# Patient Record
Sex: FEMALE | Race: WHITE | Employment: UNEMPLOYED | URBAN - METROPOLITAN AREA
[De-identification: names, ages, dates, MRNs, and addresses within clinical notes are randomized per-mention and may not be internally consistent; named-entity substitution may affect disease eponyms.]

---

## 2017-05-24 ENCOUNTER — TELEPHONE (OUTPATIENT)
Dept: INTERNAL MEDICINE CLINIC | Facility: CLINIC | Age: 62
End: 2017-05-24

## 2017-05-24 NOTE — TELEPHONE ENCOUNTER
Pt questioning if ok to get shingles vaccine prior to 62th Birthday?   Plans to have done at [facility] if Dr Jose Coleman agrees

## 2017-05-26 ENCOUNTER — TELEPHONE (OUTPATIENT)
Dept: INTERNAL MEDICINE CLINIC | Facility: CLINIC | Age: 62
End: 2017-05-26

## 2017-05-26 DIAGNOSIS — Z23 NEED FOR VACCINATION: Primary | ICD-10-CM

## 2017-08-08 ENCOUNTER — OFFICE VISIT (OUTPATIENT)
Dept: INTERNAL MEDICINE CLINIC | Facility: CLINIC | Age: 62
End: 2017-08-08

## 2017-08-08 VITALS
WEIGHT: 165 LBS | DIASTOLIC BLOOD PRESSURE: 79 MMHG | SYSTOLIC BLOOD PRESSURE: 129 MMHG | HEART RATE: 86 BPM | TEMPERATURE: 99 F | BODY MASS INDEX: 29.23 KG/M2 | HEIGHT: 63 IN

## 2017-08-08 DIAGNOSIS — Z78.0 POSTMENOPAUSAL: ICD-10-CM

## 2017-08-08 DIAGNOSIS — Z85.3 HISTORY OF BREAST CANCER: ICD-10-CM

## 2017-08-08 DIAGNOSIS — Z00.00 ROUTINE GENERAL MEDICAL EXAMINATION AT A HEALTH CARE FACILITY: Primary | ICD-10-CM

## 2017-08-08 DIAGNOSIS — Z90.13 STATUS POST BILATERAL MASTECTOMY: ICD-10-CM

## 2017-08-08 PROCEDURE — 99396 PREV VISIT EST AGE 40-64: CPT | Performed by: INTERNAL MEDICINE

## 2017-08-08 NOTE — PATIENT INSTRUCTIONS
Component      Latest Ref Rng & Units 1/31/2017 10/18/2016 10/1/2016   WHITE BLOOD CELL COUNT      3.8 - 10.8 Thousand/uL   4.7   RED BLOOD CELL COUNT      3.80 - 5.10 Million/uL   4.01   Hemoglobin      11.7 - 15.5 g/dL   13.2   Hematocrit      35.0 - 45. BILIRUBIN      NEGATIVE NEGATIVE NEGATIVE NEGATIVE   KETONES      NEGATIVE NEGATIVE NEGATIVE NEGATIVE   OCCULT BLOOD      NEGATIVE NEGATIVE NEGATIVE NEGATIVE   PROTEIN (URINE DIPSTICK)      NEGATIVE NEGATIVE NEGATIVE NEGATIVE   NITRITE, URINE      Lynda Mars A hip or vertebral (clinical or morphometric) fracture       T score < -2.5 at the femoral neck or spine after appropriate  evaluation to exclude secondary causes.        Low bone mass (T score between -1.0 and -2.5 at the femoral neck or     spine) and a

## 2017-08-11 LAB
ABSOLUTE BASOPHILS: 19 CELLS/UL (ref 0–200)
ABSOLUTE EOSINOPHILS: 110 CELLS/UL (ref 15–500)
ABSOLUTE LYMPHOCYTES: 1253 CELLS/UL (ref 850–3900)
ABSOLUTE MONOCYTES: 302 CELLS/UL (ref 200–950)
ABSOLUTE NEUTROPHILS: 3115 CELLS/UL (ref 1500–7800)
ALBUMIN/GLOBULIN RATIO: 1.6 (CALC) (ref 1–2.5)
ALBUMIN: 4.2 G/DL (ref 3.6–5.1)
ALKALINE PHOSPHATASE: 94 U/L (ref 33–130)
ALT: 18 U/L (ref 6–29)
APPEARANCE: CLEAR
AST: 17 U/L (ref 10–35)
BASOPHILS: 0.4 %
BILIRUBIN, TOTAL: 1 MG/DL (ref 0.2–1.2)
BILIRUBIN: NEGATIVE
BUN: 16 MG/DL (ref 7–25)
CALCIUM: 9.3 MG/DL (ref 8.6–10.4)
CARBON DIOXIDE: 26 MMOL/L (ref 20–31)
CHLORIDE: 105 MMOL/L (ref 98–110)
CHOL/HDLC RATIO: 5.4 (CALC)
CHOLESTEROL, TOTAL: 241 MG/DL (ref 125–200)
COLOR: YELLOW
CREATININE: 0.66 MG/DL (ref 0.5–0.99)
EGFR IF AFRICN AM: 111 ML/MIN/1.73M2
EGFR IF NONAFRICN AM: 96 ML/MIN/1.73M2
EOSINOPHILS: 2.3 %
GLOBULIN: 2.6 G/DL (CALC) (ref 1.9–3.7)
GLUCOSE: 92 MG/DL (ref 65–99)
GLUCOSE: NEGATIVE
HDL CHOLESTEROL: 45 MG/DL
HEMATOCRIT: 37.5 % (ref 35–45)
HEMOGLOBIN: 13.5 G/DL (ref 11.7–15.5)
KETONES: NEGATIVE
LDL-CHOLESTEROL: 158 MG/DL (CALC)
LYMPHOCYTES: 26.1 %
MCH: 33.6 PG (ref 27–33)
MCHC: 36 G/DL (ref 32–36)
MCV: 93.3 FL (ref 80–100)
MONOCYTES: 6.3 %
MPV: 11.6 FL (ref 7.5–12.5)
NEUTROPHILS: 64.9 %
NITRITE: NEGATIVE
NON-HDL CHOLESTEROL: 196 MG/DL (CALC)
OCCULT BLOOD: NEGATIVE
PH: 6.5 (ref 5–8)
PLATELET COUNT: 196 THOUSAND/UL (ref 140–400)
POTASSIUM: 4.2 MMOL/L (ref 3.5–5.3)
PROTEIN, TOTAL: 6.8 G/DL (ref 6.1–8.1)
PROTEIN: NEGATIVE
RDW: 13.3 % (ref 11–15)
RED BLOOD CELL COUNT: 4.02 MILLION/UL (ref 3.8–5.1)
SODIUM: 142 MMOL/L (ref 135–146)
SPECIFIC GRAVITY: 1.02 (ref 1–1.03)
T4, FREE: 1 NG/DL (ref 0.8–1.8)
TRIGLYCERIDES: 191 MG/DL
TSH: 3.33 MIU/L (ref 0.4–4.5)
WHITE BLOOD CELL COUNT: 4.8 THOUSAND/UL (ref 3.8–10.8)

## 2017-08-14 ENCOUNTER — HOSPITAL ENCOUNTER (OUTPATIENT)
Dept: BONE DENSITY | Facility: HOSPITAL | Age: 62
Discharge: HOME | End: 2017-08-14
Attending: INTERNAL MEDICINE
Payer: COMMERCIAL

## 2017-08-14 DIAGNOSIS — Z78.0 POSTMENOPAUSAL: ICD-10-CM

## 2017-08-14 PROCEDURE — 77080 DXA BONE DENSITY AXIAL: CPT | Performed by: INTERNAL MEDICINE

## 2017-08-15 NOTE — PROGRESS NOTES
HPI:    Patient ID: Norman Leal is a 63year old female.     HPI    Physical exam    /79 (BP Location: Right arm, Patient Position: Sitting, Cuff Size: adult)   Pulse 86   Temp 98.5 °F (36.9 °C) (Oral)   Ht 5' 3\" (1.6 m)   Wt 165 lb (74.8 kg) prophylactic 2013      Past Surgical History:  2013: MASTECTOMY LEFT  2013: MASTECTOMY RIGHT  2013: REMOVAL OF OVARY(S)   Family History   Problem Relation Age of Onset   • Breast Cancer Mother 72   • Breast Cancer Sister 50   • Diabetes Sister    • Cancer CANCELED:         ASSAY, THYROID STIM HORMONE, CANCELED: COMP         METABOLIC PANEL (14), CANCELED: CBC, PLATELET;         NO DIFFERENTIAL, CANCELED: LIPID PANEL,         CANCELED: URINE MICROSCOPIC W REFLEX CULTURE        Labs ordered  Doing well    (Z7

## 2017-10-27 ENCOUNTER — NURSE ONLY (OUTPATIENT)
Dept: INTERNAL MEDICINE CLINIC | Facility: CLINIC | Age: 62
End: 2017-10-27

## 2017-10-27 DIAGNOSIS — Z23 IMMUNIZATION DUE: Primary | ICD-10-CM

## 2017-10-27 PROCEDURE — 90715 TDAP VACCINE 7 YRS/> IM: CPT | Performed by: INTERNAL MEDICINE

## 2017-10-27 PROCEDURE — 90471 IMMUNIZATION ADMIN: CPT | Performed by: INTERNAL MEDICINE

## 2018-10-12 ENCOUNTER — OFFICE VISIT (OUTPATIENT)
Dept: INTERNAL MEDICINE CLINIC | Facility: CLINIC | Age: 63
End: 2018-10-12
Payer: COMMERCIAL

## 2018-10-12 VITALS
BODY MASS INDEX: 29.23 KG/M2 | HEIGHT: 63 IN | HEART RATE: 80 BPM | DIASTOLIC BLOOD PRESSURE: 77 MMHG | TEMPERATURE: 98 F | SYSTOLIC BLOOD PRESSURE: 116 MMHG | WEIGHT: 165 LBS

## 2018-10-12 DIAGNOSIS — Z00.00 ROUTINE GENERAL MEDICAL EXAMINATION AT A HEALTH CARE FACILITY: Primary | ICD-10-CM

## 2018-10-12 PROCEDURE — 99396 PREV VISIT EST AGE 40-64: CPT | Performed by: INTERNAL MEDICINE

## 2018-10-23 NOTE — PROGRESS NOTES
HPI:    Patient ID: Krystina Ramirez is a 66year old female.     HPI    Physical exam     Doing well in general  Hx of breast CA  Was told by Oncologist  Dr Sariah Kathleen she is vazquez  7 years post cancer treatment and she remains free ofcancer    BP 11 Allergies    HISTORY:  Past Medical History:   Diagnosis Date   • Cancer of breast, female [facility]) 2013    bilateral mastectomy   • Lipid screening 06/24/2014   • Osteoporosis screening 05/27/2014    DEXA   • Ovary removal, prophylactic 2013      Past Surgic (Z00.00) Routine general medical examination at a health care facility  (primary encounter diagnosis)  Plan: ASSAY, THYROID STIM HORMONE, CBC, PLATELET; NO         DIFFERENTIAL, COMP METABOLIC PANEL (14), FREE         T4 (FREE THYROXINE), HEMOGLOBIN A1C,

## 2018-10-24 ENCOUNTER — PATIENT MESSAGE (OUTPATIENT)
Dept: INTERNAL MEDICINE CLINIC | Facility: CLINIC | Age: 63
End: 2018-10-24

## 2018-10-25 NOTE — TELEPHONE ENCOUNTER
From: Arianna Quick  To: Tamiko Irving MD  Sent: 10/24/2018 4:01 PM CDT  Subject: Other    Could you have someone update your record and MyChart to show that I received the following Immunizations per your order at [address] [place] [address] Drive:    10/24/2018    Flu Sh

## 2018-10-26 ENCOUNTER — TELEPHONE (OUTPATIENT)
Dept: OTHER | Age: 63
End: 2018-10-26

## 2018-10-26 DIAGNOSIS — R82.90 ABNORMAL URINALYSIS: Primary | ICD-10-CM

## 2018-10-26 NOTE — TELEPHONE ENCOUNTER
Pt called back. Reviewed dr's results note with pt. Verbalized understanding. Lab orders generated. Pt denies any urinary symptoms. \"this has happened to me in the past, because they don't give me a wipe to clean prior to the urine testing. \"  Pt states

## 2018-10-26 NOTE — TELEPHONE ENCOUNTER
----- Message from Richelle Sandy sent at 10/26/2018 10:06 AM CDT -----      ----- Message -----  From: Niki Patterson MD  Sent: 10/26/2018   9:36 AM  To: Em Naveed Mancilla Lpn/Cma    Labs are within normal limit except high cholesterol low-cholesterol diet ad

## 2019-04-03 ENCOUNTER — OFFICE VISIT (OUTPATIENT)
Dept: INTERNAL MEDICINE CLINIC | Facility: CLINIC | Age: 64
End: 2019-04-03
Payer: COMMERCIAL

## 2019-04-03 VITALS
DIASTOLIC BLOOD PRESSURE: 87 MMHG | WEIGHT: 168 LBS | TEMPERATURE: 100 F | SYSTOLIC BLOOD PRESSURE: 145 MMHG | HEIGHT: 63 IN | BODY MASS INDEX: 29.77 KG/M2 | HEART RATE: 102 BPM

## 2019-04-03 DIAGNOSIS — J02.9 PHARYNGITIS, UNSPECIFIED ETIOLOGY: ICD-10-CM

## 2019-04-03 DIAGNOSIS — T78.3XXA ANGIOEDEMA, INITIAL ENCOUNTER: Primary | ICD-10-CM

## 2019-04-03 PROCEDURE — 99214 OFFICE O/P EST MOD 30 MIN: CPT | Performed by: INTERNAL MEDICINE

## 2019-04-03 PROCEDURE — 87880 STREP A ASSAY W/OPTIC: CPT | Performed by: INTERNAL MEDICINE

## 2019-04-03 PROCEDURE — 99212 OFFICE O/P EST SF 10 MIN: CPT | Performed by: INTERNAL MEDICINE

## 2019-04-03 RX ORDER — LEVOCETIRIZINE DIHYDROCHLORIDE 5 MG/1
5 TABLET, FILM COATED ORAL EVERY EVENING
Qty: 60 TABLET | Refills: 11 | Status: SHIPPED | OUTPATIENT
Start: 2019-04-03 | End: 2019-11-19

## 2019-04-03 RX ORDER — PREDNISONE 20 MG/1
TABLET ORAL
Qty: 20 TABLET | Refills: 0 | Status: SHIPPED | OUTPATIENT
Start: 2019-04-03 | End: 2019-11-19 | Stop reason: ALTCHOICE

## 2019-04-03 RX ORDER — RANITIDINE 300 MG/1
300 TABLET ORAL NIGHTLY
Qty: 90 TABLET | Refills: 0 | Status: SHIPPED | OUTPATIENT
Start: 2019-04-03 | End: 2019-11-19

## 2019-04-03 NOTE — PROGRESS NOTES
HPI:    Patient ID: Ronald Linda is a 66year old female.     HPI    Facial swelling work up with this the other day  Throat feels phegmy but not short of breath  Went to [place] over the weekend visited son owns a dog  Not new product does not remembe then 20 mg po bid starting tomorrow for 4 days Disp: 20 tablet Rfl: 0   Levocetirizine Dihydrochloride (XYZAL) 5 MG Oral Tab Take 1 tablet (5 mg total) by mouth every evening.  Disp: 60 tablet Rfl: 11   raNITIdine HCl 300 MG Oral Tab Take 1 tablet (300 mg t tenderness. Abdominal: Soft. Bowel sounds are normal. She exhibits no distension and no mass. There is no hepatosplenomegaly. There is no tenderness. No hernia. Musculoskeletal: She exhibits no edema or tenderness.    Lymphadenopathy:     She has no cer

## 2019-11-19 ENCOUNTER — OFFICE VISIT (OUTPATIENT)
Dept: INTERNAL MEDICINE CLINIC | Facility: CLINIC | Age: 64
End: 2019-11-19
Payer: COMMERCIAL

## 2019-11-19 VITALS
SYSTOLIC BLOOD PRESSURE: 132 MMHG | TEMPERATURE: 99 F | HEIGHT: 63 IN | BODY MASS INDEX: 29.95 KG/M2 | WEIGHT: 169 LBS | DIASTOLIC BLOOD PRESSURE: 79 MMHG | HEART RATE: 67 BPM

## 2019-11-19 DIAGNOSIS — Z00.00 ROUTINE GENERAL MEDICAL EXAMINATION AT A HEALTH CARE FACILITY: Primary | ICD-10-CM

## 2019-11-19 DIAGNOSIS — Z12.4 CERVICAL CANCER SCREENING: ICD-10-CM

## 2019-11-19 PROCEDURE — 90471 IMMUNIZATION ADMIN: CPT | Performed by: INTERNAL MEDICINE

## 2019-11-19 PROCEDURE — 90670 PCV13 VACCINE IM: CPT | Performed by: INTERNAL MEDICINE

## 2019-11-19 PROCEDURE — 99396 PREV VISIT EST AGE 40-64: CPT | Performed by: INTERNAL MEDICINE

## 2019-11-19 NOTE — PROGRESS NOTES
HPI:    Patient ID: Vickie Flores is a 60year old female.     HPI    Physical exam    Bilateral mastectomy  saphingo oophorectomy  Ongoing follow up with oncology    Due for pap smear  Denies complaint feeling well in general  /79 (BP Location: Ri Allergies    HISTORY:  Past Medical History:   Diagnosis Date   • Cancer of breast, female [facility]) 2013    bilateral mastectomy   • Lipid screening 06/24/2014   • Osteoporosis screening 05/27/2014    DEXA   • Ovary removal, prophylactic 2013      Past Surgic Musculoskeletal:         General: No tenderness or edema. Lymphadenopathy:     She has no cervical adenopathy. Neurological: She is alert. Skin: No rash noted. She is not diaphoretic. No erythema. Nursing note and vitals reviewed.            ASS

## 2019-12-30 ENCOUNTER — OFFICE VISIT (OUTPATIENT)
Dept: INTERNAL MEDICINE CLINIC | Facility: CLINIC | Age: 64
End: 2019-12-30
Payer: COMMERCIAL

## 2019-12-30 VITALS
HEIGHT: 63 IN | HEART RATE: 92 BPM | BODY MASS INDEX: 29.66 KG/M2 | WEIGHT: 167.38 LBS | SYSTOLIC BLOOD PRESSURE: 131 MMHG | DIASTOLIC BLOOD PRESSURE: 78 MMHG

## 2019-12-30 DIAGNOSIS — J30.9 ALLERGIC RHINITIS, UNSPECIFIED SEASONALITY, UNSPECIFIED TRIGGER: ICD-10-CM

## 2019-12-30 DIAGNOSIS — R05.9 COUGH: Primary | ICD-10-CM

## 2019-12-30 PROCEDURE — 99213 OFFICE O/P EST LOW 20 MIN: CPT | Performed by: PHYSICIAN ASSISTANT

## 2019-12-30 RX ORDER — ALBUTEROL SULFATE 90 UG/1
2 AEROSOL, METERED RESPIRATORY (INHALATION) EVERY 4 HOURS PRN
Qty: 1 INHALER | Refills: 0 | Status: SHIPPED | OUTPATIENT
Start: 2019-12-30 | End: 2020-12-10

## 2019-12-30 RX ORDER — BENZONATATE 100 MG/1
100 CAPSULE ORAL 3 TIMES DAILY PRN
Qty: 30 CAPSULE | Refills: 0 | Status: SHIPPED | OUTPATIENT
Start: 2019-12-30 | End: 2020-03-10

## 2019-12-30 RX ORDER — CETIRIZINE HYDROCHLORIDE 10 MG/1
10 TABLET ORAL DAILY
Qty: 30 TABLET | Refills: 1 | Status: SHIPPED | OUTPATIENT
Start: 2019-12-30 | End: 2020-03-10

## 2019-12-30 NOTE — PROGRESS NOTES
HPI:    Patient ID: Deborah Ng is a 60year old female. HPI   Patient presents complaining of a cough that started on Thursday. Notes that she has had intermittent cough, itchy eyes and some sneezing. This morning noticed that she was wheezing. 2013   \  Family History   Problem Relation Age of Onset   • Cancer Other 61        breast   • Breast Cancer Mother 72   • Breast Cancer Sister 50   • Diabetes Sister          PHYSICAL EXAM:   /78 (BP Location: Right arm, Patient Position: Sitting, Prescriptions     Signed Prescriptions Disp Refills   • cetirizine 10 MG Oral Tab 30 tablet 1     Sig: Take 1 tablet (10 mg total) by mouth daily.    • benzonatate (TESSALON PERLES) 100 MG Oral Cap 30 capsule 0     Sig: Take 1 capsule (100 mg total) by mout

## 2020-03-10 ENCOUNTER — OFFICE VISIT (OUTPATIENT)
Dept: INTERNAL MEDICINE CLINIC | Facility: CLINIC | Age: 65
End: 2020-03-10
Payer: COMMERCIAL

## 2020-03-10 VITALS
HEART RATE: 73 BPM | DIASTOLIC BLOOD PRESSURE: 65 MMHG | TEMPERATURE: 98 F | HEIGHT: 63 IN | BODY MASS INDEX: 30.59 KG/M2 | SYSTOLIC BLOOD PRESSURE: 138 MMHG | WEIGHT: 172.63 LBS

## 2020-03-10 DIAGNOSIS — R05.9 COUGH: Primary | ICD-10-CM

## 2020-03-10 PROCEDURE — 99213 OFFICE O/P EST LOW 20 MIN: CPT | Performed by: PHYSICIAN ASSISTANT

## 2020-03-10 RX ORDER — CETIRIZINE HYDROCHLORIDE 10 MG/1
10 TABLET ORAL DAILY
Qty: 30 TABLET | Refills: 0 | Status: SHIPPED | OUTPATIENT
Start: 2020-03-10 | End: 2020-12-10

## 2020-03-10 RX ORDER — BENZONATATE 100 MG/1
100 CAPSULE ORAL 3 TIMES DAILY PRN
Qty: 30 CAPSULE | Refills: 0 | Status: SHIPPED | OUTPATIENT
Start: 2020-03-10 | End: 2020-12-10

## 2020-03-10 NOTE — PROGRESS NOTES
HPI:    Patient ID: Collins Aguilera is a 60year old female. HPI   Pt states coughing since friday, states got shingles shot friday also that's when coughing started, states taking OTC medication with no relief.   Denies any fevers, chills or body aches 63        breast   • Breast Cancer Mother 72   • Breast Cancer Sister 50   • Diabetes Sister          PHYSICAL EXAM:   /65 (BP Location: Right arm, Patient Position: Sitting, Cuff Size: large)   Pulse 73   Temp 98.1 °F (36.7 °C) (Oral)   Ht 5' 3\" (1 mouth daily. • benzonatate (TESSALON PERLES) 100 MG Oral Cap 30 capsule 0     Sig: Take 1 capsule (100 mg total) by mouth 3 (three) times daily as needed for cough.        Imaging & Referrals:  None         #[id number]

## 2020-11-24 RX ORDER — ROSUVASTATIN CALCIUM 10 MG/1
TABLET, COATED ORAL
Qty: 90 TABLET | Refills: 1 | Status: SHIPPED | OUTPATIENT
Start: 2020-11-24 | End: 2021-05-31

## 2020-12-10 ENCOUNTER — LAB ENCOUNTER (OUTPATIENT)
Dept: LAB | Age: 65
End: 2020-12-10
Attending: INTERNAL MEDICINE
Payer: COMMERCIAL

## 2020-12-10 ENCOUNTER — OFFICE VISIT (OUTPATIENT)
Dept: INTERNAL MEDICINE CLINIC | Facility: CLINIC | Age: 65
End: 2020-12-10
Payer: COMMERCIAL

## 2020-12-10 VITALS
TEMPERATURE: 99 F | SYSTOLIC BLOOD PRESSURE: 132 MMHG | WEIGHT: 173 LBS | HEIGHT: 63 IN | BODY MASS INDEX: 30.65 KG/M2 | HEART RATE: 71 BPM | DIASTOLIC BLOOD PRESSURE: 79 MMHG

## 2020-12-10 DIAGNOSIS — Z00.00 ROUTINE GENERAL MEDICAL EXAMINATION AT A HEALTH CARE FACILITY: ICD-10-CM

## 2020-12-10 DIAGNOSIS — Z85.3 HISTORY OF BREAST CANCER: ICD-10-CM

## 2020-12-10 DIAGNOSIS — E55.9 VITAMIN D DEFICIENCY: ICD-10-CM

## 2020-12-10 DIAGNOSIS — Z00.00 ROUTINE GENERAL MEDICAL EXAMINATION AT A HEALTH CARE FACILITY: Primary | ICD-10-CM

## 2020-12-10 PROCEDURE — 36415 COLL VENOUS BLD VENIPUNCTURE: CPT | Performed by: INTERNAL MEDICINE

## 2020-12-10 PROCEDURE — 81015 MICROSCOPIC EXAM OF URINE: CPT

## 2020-12-10 PROCEDURE — 80061 LIPID PANEL: CPT | Performed by: INTERNAL MEDICINE

## 2020-12-10 PROCEDURE — 3075F SYST BP GE 130 - 139MM HG: CPT | Performed by: INTERNAL MEDICINE

## 2020-12-10 PROCEDURE — 80053 COMPREHEN METABOLIC PANEL: CPT | Performed by: INTERNAL MEDICINE

## 2020-12-10 PROCEDURE — 85027 COMPLETE CBC AUTOMATED: CPT | Performed by: INTERNAL MEDICINE

## 2020-12-10 PROCEDURE — 99396 PREV VISIT EST AGE 40-64: CPT | Performed by: INTERNAL MEDICINE

## 2020-12-10 PROCEDURE — 82306 VITAMIN D 25 HYDROXY: CPT | Performed by: INTERNAL MEDICINE

## 2020-12-10 PROCEDURE — 83036 HEMOGLOBIN GLYCOSYLATED A1C: CPT | Performed by: INTERNAL MEDICINE

## 2020-12-10 PROCEDURE — 3008F BODY MASS INDEX DOCD: CPT | Performed by: INTERNAL MEDICINE

## 2020-12-10 PROCEDURE — 3078F DIAST BP <80 MM HG: CPT | Performed by: INTERNAL MEDICINE

## 2020-12-10 PROCEDURE — 84439 ASSAY OF FREE THYROXINE: CPT | Performed by: INTERNAL MEDICINE

## 2020-12-10 PROCEDURE — 84443 ASSAY THYROID STIM HORMONE: CPT | Performed by: INTERNAL MEDICINE

## 2020-12-18 NOTE — PROGRESS NOTES
HPI:    Patient ID: Siobhan Martinez is a 63year old female.     HPI    Physical exam  Hx of breast CA  Ongoing follow up at [facility]   No known recurrence  Doing well in general    /79 (BP Location: Right arm, Patient Position: Sitting, Cuff Size: adult by ORAL route  every day with food       Allergies:No Known Allergies    HISTORY:  Past Medical History:   Diagnosis Date   • Cancer of breast, female [facility]) 2013    bilateral mastectomy   • Lipid screening 06/24/2014   • Osteoporosis screening 05/27/2014 erythema. Nursing note and vitals reviewed.            ASSESSMENT/PLAN:   (Z00.00) Routine general medical examination at a health care facility  (primary encounter diagnosis)  Plan: ASSAY, THYROID STIM HORMONE, FREE T4 (FREE         THYROXINE), LIPID PAN

## 2021-03-16 RX ORDER — ERGOCALCIFEROL 1.25 MG/1
50000 CAPSULE ORAL
Qty: 6 CAPSULE | Refills: 1 | Status: SHIPPED | OUTPATIENT
Start: 2021-03-16 | End: 2021-06-03

## 2021-05-31 RX ORDER — ROSUVASTATIN CALCIUM 10 MG/1
TABLET, COATED ORAL
Qty: 90 TABLET | Refills: 1 | Status: SHIPPED | OUTPATIENT
Start: 2021-05-31 | End: 2021-11-22

## 2021-06-01 ENCOUNTER — TELEPHONE (OUTPATIENT)
Dept: INTERNAL MEDICINE CLINIC | Facility: CLINIC | Age: 66
End: 2021-06-01

## 2021-06-01 DIAGNOSIS — R79.89 LOW VITAMIN D LEVEL: Primary | ICD-10-CM

## 2021-06-01 NOTE — TELEPHONE ENCOUNTER
Left message to call back to relay OTC recommendations again--patient did view results letter sent by Dr. Dorothea Ambriz MA in December       Marely Stevenson MD   12/23/2020 11:54 PM CST       Send letter and copy of test result.   Low vit D     presccription s

## 2021-06-01 NOTE — TELEPHONE ENCOUNTER
Patient states she has been  taking 1 ergocalciferol 1.25 mg (33807ST) capsule every 14 days starting 3/16/21. This medication was prescribed  3/16/21. Patient has 1 refill available.      Please advise if patient should continue to take this dosage or what

## 2021-06-01 NOTE — TELEPHONE ENCOUNTER
Pt states that she is down to her last pill for Vitamin D2. Pt would like to know when she runs out of the vitamin this week does the doctor still want her to continue to take it every 2 weeks or does the doctor want her to take something over the counter.

## 2021-06-01 NOTE — TELEPHONE ENCOUNTER
Spoke with patient ( verified) and relayed Dr. Flakita Franklin message below--patient verbalizes understanding and agreement. No further questions/concerns at this time. Vitamin D level ordered--patient will go to [facility], likely tomorrow to complete.

## 2021-06-03 ENCOUNTER — TELEPHONE (OUTPATIENT)
Dept: INTERNAL MEDICINE CLINIC | Facility: CLINIC | Age: 66
End: 2021-06-03

## 2021-06-03 NOTE — TELEPHONE ENCOUNTER
Spoke with patient, (  verified ) informed of 's  instructions below  Patient verbalizes understanding and agrees.

## 2021-06-03 NOTE — TELEPHONE ENCOUNTER
Called pt regarding message below. Pt saw the normal vitamin D level result on MyChart (= 73) and is wanting to know if she is still supposed to take vitamin D 5,000 units every 14 days.  States she received a call from her pharmacy yesterday to pick it up

## 2021-08-26 ENCOUNTER — TELEPHONE (OUTPATIENT)
Dept: INTERNAL MEDICINE CLINIC | Facility: CLINIC | Age: 66
End: 2021-08-26

## 2021-08-26 NOTE — TELEPHONE ENCOUNTER
-  Please review message below. Upon review of chart, this was the last result note from Vitamin D labs. Vitamin D was 73. Please advise    6/4/2021 12:15 AM CDT Back to Top      Send letter and copy of test result.   Vit D is normal  Cont supp

## 2021-08-27 NOTE — TELEPHONE ENCOUNTER
Spoke with patient, (  verified ) informed of 's   instructions below    Advised to try to take the Vitamin D on the same day of every month for consistency    Patient verbalizes understanding and agrees.

## 2021-11-11 ENCOUNTER — TELEPHONE (OUTPATIENT)
Dept: INTERNAL MEDICINE CLINIC | Facility: CLINIC | Age: 66
End: 2021-11-11

## 2021-11-11 NOTE — TELEPHONE ENCOUNTER
Per patient she finished her Vit D last 11/08/2021 and she has an appointment for her yearly physical on 12/21/2021, patient would like to know if she needs to continue the Vit D if so she needs a refill send to her pharmacy on file.

## 2021-11-12 RX ORDER — ACETAMINOPHEN 160 MG
2000 TABLET,DISINTEGRATING ORAL DAILY
Refills: 0 | COMMUNITY
Start: 2021-11-12

## 2021-11-12 NOTE — TELEPHONE ENCOUNTER
Patient informed of provider's response/recommendations below and voiced understating and agrees with all. Med list updated with new OTC dose.

## 2021-11-22 RX ORDER — ROSUVASTATIN CALCIUM 10 MG/1
TABLET, COATED ORAL
Qty: 90 TABLET | Refills: 1 | Status: SHIPPED | OUTPATIENT
Start: 2021-11-22

## 2021-11-24 ENCOUNTER — OFFICE VISIT (OUTPATIENT)
Dept: DERMATOLOGY CLINIC | Facility: CLINIC | Age: 66
End: 2021-11-24
Payer: COMMERCIAL

## 2021-11-24 DIAGNOSIS — L57.0 AK (ACTINIC KERATOSIS): Primary | ICD-10-CM

## 2021-11-24 DIAGNOSIS — D22.9 MULTIPLE NEVI: ICD-10-CM

## 2021-11-24 DIAGNOSIS — L82.1 SEBORRHEIC KERATOSES: ICD-10-CM

## 2021-11-24 DIAGNOSIS — L91.8 ACHROCHORDON: ICD-10-CM

## 2021-11-24 DIAGNOSIS — D23.9 BENIGN NEOPLASM OF SKIN, UNSPECIFIED LOCATION: ICD-10-CM

## 2021-11-24 DIAGNOSIS — L81.4 LENTIGO: ICD-10-CM

## 2021-11-24 PROCEDURE — 99203 OFFICE O/P NEW LOW 30 MIN: CPT | Performed by: DERMATOLOGY

## 2021-12-06 NOTE — PROGRESS NOTES
Doreen Hill is a 61year old female. HPI:     CC:  Patient presents with:  Full Skin Exam: New Patient present for full body skin exam .Patient deneis any hx of sc         Allergies:  Patient has no known allergies.     HISTORY:    Past Medical Histor status: Never Smoker      Smokeless tobacco: Never Used    Substance and Sexual Activity      Alcohol use: No      Drug use: No      Sexual activity: Not on file    Other Topics      Concerns:         Service: Not Asked        Blood Transfusions: N distress. Exam total-body performed, including scalp, head, neck, face,nails, hair, external eyes, including conjunctival mucosa, eyelids, lips external ears, back, chest,/ breasts, axillae,  abdomen, arms, legs, palms.      Multiple light to medium brown, Instructions reviewed at length. Benign nevi, seborrheic  keratoses, cherry angiomas:  Reassurance regarding other benign skin lesions. Signs and symptoms of skin cancer, ABCDE's of melanoma discussed with patient.  Sunscreen use, sun protection, self exa

## 2021-12-21 ENCOUNTER — OFFICE VISIT (OUTPATIENT)
Dept: INTERNAL MEDICINE CLINIC | Facility: CLINIC | Age: 66
End: 2021-12-21
Payer: COMMERCIAL

## 2021-12-21 VITALS
BODY MASS INDEX: 30.3 KG/M2 | DIASTOLIC BLOOD PRESSURE: 76 MMHG | HEART RATE: 75 BPM | WEIGHT: 171 LBS | SYSTOLIC BLOOD PRESSURE: 138 MMHG | HEIGHT: 63 IN

## 2021-12-21 DIAGNOSIS — Z00.00 ROUTINE GENERAL MEDICAL EXAMINATION AT A HEALTH CARE FACILITY: Primary | ICD-10-CM

## 2021-12-21 DIAGNOSIS — Z90.13 STATUS POST BILATERAL MASTECTOMY: ICD-10-CM

## 2021-12-21 PROCEDURE — 99396 PREV VISIT EST AGE 40-64: CPT | Performed by: INTERNAL MEDICINE

## 2021-12-21 PROCEDURE — 3008F BODY MASS INDEX DOCD: CPT | Performed by: INTERNAL MEDICINE

## 2021-12-21 PROCEDURE — 3078F DIAST BP <80 MM HG: CPT | Performed by: INTERNAL MEDICINE

## 2021-12-21 PROCEDURE — 3075F SYST BP GE 130 - 139MM HG: CPT | Performed by: INTERNAL MEDICINE

## 2021-12-22 NOTE — PROGRESS NOTES
HPI:    Patient ID: Trenda Curling is a 61year old female.     HPI    Physical exam  Hx of bilateral mastectomy  Followed by Dinah Bobby  Recent visit with breast surgeon Appt with oncology next year  Doing well in general  No hx of pancreatic CA in f Medications   Medication Sig Dispense Refill   • ROSUVASTATIN 10 MG Oral Tab TAKE 1 TABLET BY MOUTH EVERY DAY AT NIGHT 90 tablet 1   • Vitamin D3, Cholecalciferol, 50 MCG (2000 UT) Oral Cap Take 1 capsule (2,000 Units total) by mouth daily.   0   • Multiple Breath sounds: Normal breath sounds. No wheezing or rales. Chest:      Chest wall: No tenderness. Abdominal:      General: Bowel sounds are normal. There is no distension. Palpations: Abdomen is soft. There is no mass. Tenderness:  There is no

## 2022-03-21 ENCOUNTER — NURSE ONLY (OUTPATIENT)
Dept: LAB | Age: 67
End: 2022-03-21
Attending: INTERNAL MEDICINE
Payer: COMMERCIAL

## 2022-03-21 ENCOUNTER — TELEMEDICINE (OUTPATIENT)
Dept: INTERNAL MEDICINE CLINIC | Facility: CLINIC | Age: 67
End: 2022-03-21

## 2022-03-21 DIAGNOSIS — J01.90 ACUTE NON-RECURRENT SINUSITIS, UNSPECIFIED LOCATION: ICD-10-CM

## 2022-03-21 DIAGNOSIS — J02.9 PHARYNGITIS, UNSPECIFIED ETIOLOGY: ICD-10-CM

## 2022-03-21 DIAGNOSIS — J02.9 PHARYNGITIS, UNSPECIFIED ETIOLOGY: Primary | ICD-10-CM

## 2022-03-21 PROCEDURE — 99442 PHONE E/M BY PHYS 11-20 MIN: CPT | Performed by: INTERNAL MEDICINE

## 2022-03-21 RX ORDER — AMOXICILLIN 875 MG/1
875 TABLET, COATED ORAL 2 TIMES DAILY
Qty: 20 TABLET | Refills: 0 | Status: SHIPPED | OUTPATIENT
Start: 2022-03-21 | End: 2022-03-31

## 2022-03-21 RX ORDER — PSEUDOEPHEDRINE HCL 120 MG/1
120 TABLET, FILM COATED, EXTENDED RELEASE ORAL EVERY 12 HOURS PRN
Qty: 40 TABLET | Refills: 1 | Status: SHIPPED | OUTPATIENT
Start: 2022-03-21

## 2022-03-22 LAB — SARS-COV-2 RNA RESP QL NAA+PROBE: NOT DETECTED

## 2022-05-11 ENCOUNTER — TELEPHONE (OUTPATIENT)
Dept: INTERNAL MEDICINE CLINIC | Facility: CLINIC | Age: 67
End: 2022-05-11

## 2022-05-11 RX ORDER — ROSUVASTATIN CALCIUM 10 MG/1
10 TABLET, COATED ORAL NIGHTLY
Qty: 90 TABLET | Refills: 1 | Status: SHIPPED | OUTPATIENT
Start: 2022-05-11 | End: 2022-11-04

## 2022-05-11 NOTE — TELEPHONE ENCOUNTER
Refill passed per [facility] protocol.   Requested Prescriptions   Pending Prescriptions Disp Refills    ROSUVASTATIN 10 MG Oral Tab [Pharmacy Med Name: ROSUVASTATIN CALCIUM 10 MG TAB] 90 tablet 1     Sig: TAKE 1 TABLET BY MOUTH EVERY DAY AT NIGHT        Cholesterol Medication Protocol Passed - 5/11/2022  1:50 PM        Passed - ALT in past 12 months        Passed - LDL in past 12 months        Passed - Last ALT < 80       Lab Results   Component Value Date    ALT 25 12/23/2021             Passed - Last LDL < 130     Lab Results   Component Value Date    LDL 92 12/23/2021               Passed - Appointment in past 12 or next 3 months             Recent Outpatient Visits              1 month ago Pharyngitis, unspecified etiology    [address]    Nurse Only    1 month ago Pharyngitis, unspecified etiology    [facility], Alesha Reddy MD    Telemedicine    4 months ago Routine general medical examination at a health care facility    [place] [facility] [place], [facility], [address], Peter Linn MD    Office Visit    5 months ago AK (actinic keratosis)    [address] [facility] Gerardo Nieves MD    Office Visit    1 year ago Routine general medical examination at a health care facility    Zaynab Kate MD    Office Visit

## 2022-05-11 NOTE — TELEPHONE ENCOUNTER
Patient called requesting a call back from Dr. Joseph Lagos in regards to getting the COVID booster for her and her . Patient awaiting call back.

## 2022-05-12 NOTE — TELEPHONE ENCOUNTER
Spoke with patient, (  verified ) informed of '  Message  Below    Will   Get COVID  Booster at [place]

## 2022-05-12 NOTE — TELEPHONE ENCOUNTER
Cdc guideline  Notify pt    Right now, you are eligible for a 2nd COVID-19 booster if you:  Are 48years of age or older and got your 1st booster at least 4 months ago

## 2022-10-15 ENCOUNTER — OFFICE VISIT (OUTPATIENT)
Dept: INTERNAL MEDICINE CLINIC | Facility: CLINIC | Age: 67
End: 2022-10-15
Payer: MEDICARE

## 2022-10-15 VITALS
HEART RATE: 101 BPM | SYSTOLIC BLOOD PRESSURE: 147 MMHG | DIASTOLIC BLOOD PRESSURE: 84 MMHG | HEIGHT: 63 IN | WEIGHT: 171 LBS | OXYGEN SATURATION: 98 % | BODY MASS INDEX: 30.3 KG/M2

## 2022-10-15 DIAGNOSIS — Z20.822 SUSPECTED COVID-19 VIRUS INFECTION: Primary | ICD-10-CM

## 2022-10-15 PROCEDURE — 99213 OFFICE O/P EST LOW 20 MIN: CPT | Performed by: NURSE PRACTITIONER

## 2022-10-17 ENCOUNTER — TELEPHONE (OUTPATIENT)
Dept: INTERNAL MEDICINE CLINIC | Facility: CLINIC | Age: 67
End: 2022-10-17

## 2022-10-17 DIAGNOSIS — Z20.822 SUSPECTED COVID-19 VIRUS INFECTION: Primary | ICD-10-CM

## 2022-10-17 RX ORDER — BENZONATATE 200 MG/1
200 CAPSULE ORAL 3 TIMES DAILY PRN
Qty: 30 CAPSULE | Refills: 0 | Status: SHIPPED | OUTPATIENT
Start: 2022-10-17

## 2022-10-17 NOTE — TELEPHONE ENCOUNTER
Joi Corcoran called from Lab x [phone]. She sees a Covid test on the packing slip but they did not receive a test.     She requested a call back from the office. EDSON WHITAKER, please call Lab.

## 2022-10-17 NOTE — TELEPHONE ENCOUNTER
Advised patient of CHELSIE Larkin's  note. Patient verbalized understanding and had no further questions.

## 2022-10-17 NOTE — TELEPHONE ENCOUNTER
I asked Patrick jane at the [address] [place] [address] location to see if the specimen is still in the  box upstairs in . She informed me that the specimen is still there and no one pick it up. I called lab and informed them that specimen was never picked up and is still in at [facility] location. He verbalized understanding and will document that. I inquired if they'll  sample today and he informed me that there is a scheduled  there for tonight. I verbalized understanding.

## 2022-10-17 NOTE — TELEPHONE ENCOUNTER
Patient saw Adwoa Saavedra on 10/15 and should have had prescription sent, but no prescription was sent. Could not verify medication name, but stated it was supposed to be capsules.

## 2022-10-17 NOTE — TELEPHONE ENCOUNTER
Please see note below and advise. Patient had office visit on 10/15/2022 with Micah Guerrero. COVID results still in process.

## 2022-10-18 LAB — SARS-COV-2 RNA RESP QL NAA+PROBE: NOT DETECTED

## 2022-10-26 ENCOUNTER — TELEPHONE (OUTPATIENT)
Dept: INTERNAL MEDICINE CLINIC | Facility: CLINIC | Age: 67
End: 2022-10-26

## 2022-10-26 NOTE — TELEPHONE ENCOUNTER
Patient called (identified name and ),   Saw Yesica BERTRAND on 10/15 for cold symptoms/suspected Covid, but negative PCR test.  Has been using Delsym and benzonatate, using saline, vaporizer. Still has cough, spits up a little yellow but not sure if sputum or post nasal drip. Still feels \"plugged up\" but reports able to breath through nose. Feels some clicking in ears. Tried Claritin D before office visit with NP. Has 3 more benzonatates left. Denies fever, wheezing, chest pain. No audible wheeze noted during phone call. Patient asking what else to do about cough and congestion? Wanted Dr Marleen Veras opinion. Yesica Munson NP is not in the office today. Please advise? Prescription cough med? Mucinex D?

## 2022-10-27 NOTE — TELEPHONE ENCOUNTER
Spoke with pt,  verified, pt is returning our call from yesterday. Pt was informed of MD recommendation. Pt stated she is taking Claritin D with minimal relief and this morning when she blow her nose, she had yellow mucous. Pt was advised to continue present management and make f/u appt if needed. Pt stated understanding.

## 2023-01-18 ENCOUNTER — OFFICE VISIT (OUTPATIENT)
Dept: INTERNAL MEDICINE CLINIC | Facility: CLINIC | Age: 68
End: 2023-01-18

## 2023-01-18 ENCOUNTER — LAB ENCOUNTER (OUTPATIENT)
Dept: LAB | Age: 68
End: 2023-01-18
Attending: INTERNAL MEDICINE
Payer: MEDICARE

## 2023-01-18 VITALS
HEART RATE: 83 BPM | WEIGHT: 174 LBS | SYSTOLIC BLOOD PRESSURE: 158 MMHG | DIASTOLIC BLOOD PRESSURE: 82 MMHG | BODY MASS INDEX: 30.83 KG/M2 | HEIGHT: 63 IN

## 2023-01-18 DIAGNOSIS — Z85.3 HISTORY OF BREAST CANCER: ICD-10-CM

## 2023-01-18 DIAGNOSIS — Z12.4 SCREENING FOR CERVICAL CANCER: ICD-10-CM

## 2023-01-18 DIAGNOSIS — E78.5 HYPERLIPIDEMIA, UNSPECIFIED HYPERLIPIDEMIA TYPE: ICD-10-CM

## 2023-01-18 DIAGNOSIS — E55.9 VITAMIN D DEFICIENCY: ICD-10-CM

## 2023-01-18 DIAGNOSIS — Z90.13 STATUS POST BILATERAL MASTECTOMY: ICD-10-CM

## 2023-01-18 DIAGNOSIS — Z00.00 MEDICARE ANNUAL WELLNESS VISIT, SUBSEQUENT: Primary | ICD-10-CM

## 2023-01-18 DIAGNOSIS — Z00.00 ENCOUNTER FOR ANNUAL HEALTH EXAMINATION: ICD-10-CM

## 2023-01-18 DIAGNOSIS — R73.03 PREDIABETES: ICD-10-CM

## 2023-01-18 DIAGNOSIS — Z78.0 POSTMENOPAUSAL: ICD-10-CM

## 2023-01-18 LAB
ALBUMIN SERPL-MCNC: 3.7 G/DL (ref 3.4–5)
ALBUMIN/GLOB SERPL: 0.9 {RATIO} (ref 1–2)
ALP LIVER SERPL-CCNC: 82 U/L
ALT SERPL-CCNC: 30 U/L
ANION GAP SERPL CALC-SCNC: 4 MMOL/L (ref 0–18)
AST SERPL-CCNC: 17 U/L (ref 15–37)
BASOPHILS # BLD AUTO: 0.02 X10(3) UL (ref 0–0.2)
BASOPHILS NFR BLD AUTO: 0.4 %
BILIRUB SERPL-MCNC: 1 MG/DL (ref 0.1–2)
BILIRUB UR QL: NEGATIVE
BUN BLD-MCNC: 18 MG/DL (ref 7–18)
BUN/CREAT SERPL: 22.8 (ref 10–20)
CALCIUM BLD-MCNC: 9.5 MG/DL (ref 8.5–10.1)
CHLORIDE SERPL-SCNC: 108 MMOL/L (ref 98–112)
CHOLEST SERPL-MCNC: 172 MG/DL (ref ?–200)
CLARITY UR: CLEAR
CO2 SERPL-SCNC: 28 MMOL/L (ref 21–32)
COLOR UR: YELLOW
CREAT BLD-MCNC: 0.79 MG/DL
DEPRECATED RDW RBC AUTO: 46.6 FL (ref 35.1–46.3)
EOSINOPHIL # BLD AUTO: 0.11 X10(3) UL (ref 0–0.7)
EOSINOPHIL NFR BLD AUTO: 2 %
ERYTHROCYTE [DISTWIDTH] IN BLOOD BY AUTOMATED COUNT: 13.2 % (ref 11–15)
EST. AVERAGE GLUCOSE BLD GHB EST-MCNC: 134 MG/DL (ref 68–126)
FASTING PATIENT LIPID ANSWER: YES
FASTING STATUS PATIENT QL REPORTED: YES
GFR SERPLBLD BASED ON 1.73 SQ M-ARVRAT: 83 ML/MIN/1.73M2 (ref 60–?)
GLOBULIN PLAS-MCNC: 4 G/DL (ref 2.8–4.4)
GLUCOSE BLD-MCNC: 111 MG/DL (ref 70–99)
GLUCOSE UR-MCNC: NEGATIVE MG/DL
HBA1C MFR BLD: 6.3 % (ref ?–5.7)
HCT VFR BLD AUTO: 41 %
HDLC SERPL-MCNC: 54 MG/DL (ref 40–59)
HGB BLD-MCNC: 13.8 G/DL
HGB UR QL STRIP.AUTO: NEGATIVE
IMM GRANULOCYTES # BLD AUTO: 0.02 X10(3) UL (ref 0–1)
IMM GRANULOCYTES NFR BLD: 0.4 %
KETONES UR-MCNC: NEGATIVE MG/DL
LDLC SERPL CALC-MCNC: 91 MG/DL (ref ?–100)
LEUKOCYTE ESTERASE UR QL STRIP.AUTO: NEGATIVE
LYMPHOCYTES # BLD AUTO: 1.18 X10(3) UL (ref 1–4)
LYMPHOCYTES NFR BLD AUTO: 21.3 %
MCH RBC QN AUTO: 32 PG (ref 26–34)
MCHC RBC AUTO-ENTMCNC: 33.7 G/DL (ref 31–37)
MCV RBC AUTO: 95.1 FL
MONOCYTES # BLD AUTO: 0.27 X10(3) UL (ref 0.1–1)
MONOCYTES NFR BLD AUTO: 4.9 %
NEUTROPHILS # BLD AUTO: 3.95 X10 (3) UL (ref 1.5–7.7)
NEUTROPHILS # BLD AUTO: 3.95 X10(3) UL (ref 1.5–7.7)
NEUTROPHILS NFR BLD AUTO: 71 %
NITRITE UR QL STRIP.AUTO: NEGATIVE
NONHDLC SERPL-MCNC: 118 MG/DL (ref ?–130)
OSMOLALITY SERPL CALC.SUM OF ELEC: 293 MOSM/KG (ref 275–295)
PH UR: 6 [PH] (ref 5–8)
PLATELET # BLD AUTO: 209 10(3)UL (ref 150–450)
POTASSIUM SERPL-SCNC: 4.1 MMOL/L (ref 3.5–5.1)
PROT SERPL-MCNC: 7.7 G/DL (ref 6.4–8.2)
PROT UR-MCNC: NEGATIVE MG/DL
RBC # BLD AUTO: 4.31 X10(6)UL
SODIUM SERPL-SCNC: 140 MMOL/L (ref 136–145)
SP GR UR STRIP: 1.02 (ref 1–1.03)
T4 FREE SERPL-MCNC: 0.8 NG/DL (ref 0.8–1.7)
TRIGL SERPL-MCNC: 156 MG/DL (ref 30–149)
TSI SER-ACNC: 2.09 MIU/ML (ref 0.36–3.74)
UROBILINOGEN UR STRIP-ACNC: 0.2
VIT D+METAB SERPL-MCNC: 55.8 NG/ML (ref 30–100)
VLDLC SERPL CALC-MCNC: 25 MG/DL (ref 0–30)
WBC # BLD AUTO: 5.6 X10(3) UL (ref 4–11)

## 2023-01-18 PROCEDURE — 36415 COLL VENOUS BLD VENIPUNCTURE: CPT | Performed by: INTERNAL MEDICINE

## 2023-01-18 PROCEDURE — 82306 VITAMIN D 25 HYDROXY: CPT

## 2023-01-18 PROCEDURE — 85025 COMPLETE CBC W/AUTO DIFF WBC: CPT | Performed by: INTERNAL MEDICINE

## 2023-01-18 PROCEDURE — 90677 PCV20 VACCINE IM: CPT | Performed by: INTERNAL MEDICINE

## 2023-01-18 PROCEDURE — G0439 PPPS, SUBSEQ VISIT: HCPCS | Performed by: INTERNAL MEDICINE

## 2023-01-18 PROCEDURE — 81003 URINALYSIS AUTO W/O SCOPE: CPT | Performed by: INTERNAL MEDICINE

## 2023-01-18 PROCEDURE — 84439 ASSAY OF FREE THYROXINE: CPT | Performed by: INTERNAL MEDICINE

## 2023-01-18 PROCEDURE — 84443 ASSAY THYROID STIM HORMONE: CPT | Performed by: INTERNAL MEDICINE

## 2023-01-18 PROCEDURE — 83036 HEMOGLOBIN GLYCOSYLATED A1C: CPT | Performed by: INTERNAL MEDICINE

## 2023-01-18 PROCEDURE — 80061 LIPID PANEL: CPT | Performed by: INTERNAL MEDICINE

## 2023-01-18 PROCEDURE — 80053 COMPREHEN METABOLIC PANEL: CPT | Performed by: INTERNAL MEDICINE

## 2023-01-18 PROCEDURE — G0009 ADMIN PNEUMOCOCCAL VACCINE: HCPCS | Performed by: INTERNAL MEDICINE

## 2023-01-19 DIAGNOSIS — R73.03 PREDIABETES: Primary | ICD-10-CM

## 2023-01-19 RX ORDER — METFORMIN HYDROCHLORIDE 500 MG/1
500 TABLET, EXTENDED RELEASE ORAL DAILY
Qty: 90 TABLET | Refills: 0 | Status: SHIPPED | OUTPATIENT
Start: 2023-01-19

## 2023-02-01 ENCOUNTER — HOSPITAL ENCOUNTER (OUTPATIENT)
Dept: BONE DENSITY | Facility: HOSPITAL | Age: 68
Discharge: HOME | End: 2023-02-01
Attending: INTERNAL MEDICINE
Payer: MEDICARE

## 2023-02-01 DIAGNOSIS — Z78.0 POSTMENOPAUSAL: ICD-10-CM

## 2023-02-01 PROCEDURE — 77080 DXA BONE DENSITY AXIAL: CPT | Performed by: INTERNAL MEDICINE

## 2023-03-11 ENCOUNTER — MOBILE ENCOUNTER (OUTPATIENT)
Dept: INTERNAL MEDICINE CLINIC | Facility: CLINIC | Age: 68
End: 2023-03-11

## 2023-03-11 ENCOUNTER — NURSE TRIAGE (OUTPATIENT)
Dept: INTERNAL MEDICINE CLINIC | Facility: CLINIC | Age: 68
End: 2023-03-11

## 2023-03-11 NOTE — TELEPHONE ENCOUNTER
Spoke to Dr. Juli Beal via secure chat. Paxlovid sent to [facility]. Patient is stop rosuvastatin due to a drug interaction with rosuvastatin and Paxlovid. Patient is to continue to hold rosuvastatin a total of  three days after finishing Paxlovid. Paxlovid is to be given within five days from the start of symptoms. Spoke to patient and advised her of instructions above and patient verbalized understanding.

## 2023-04-17 DIAGNOSIS — R73.03 PREDIABETES: ICD-10-CM

## 2023-04-17 RX ORDER — METFORMIN HYDROCHLORIDE 500 MG/1
500 TABLET, EXTENDED RELEASE ORAL DAILY
Qty: 90 TABLET | Refills: 0 | Status: SHIPPED | OUTPATIENT
Start: 2023-04-17

## 2023-04-17 NOTE — TELEPHONE ENCOUNTER
Please review; protocol failed. Requested Prescriptions   Pending Prescriptions Disp Refills    METFORMIN  MG Oral Tablet 24 Hr [Pharmacy Med Name: METFORMIN HCL  MG TABLET] 90 tablet 0     Sig: Take 1 tablet (500 mg total) by mouth daily.        There is no refill protocol information for this order          Future Appointments         Provider Department Appt Notes    In 4 days William Corley MD [address], [facility] f/u poss DM  \"policy informed\"            Recent Outpatient Visits              2 months ago [facility] annual wellness visit, subsequent    Juan Ansari MD    Office Visit    6 months ago Suspected COVID-19 virus infection    [facility], [address], [place], Anival Bush., APRN    Office Visit    1 year ago Pharyngitis, unspecified etiology    [address], [place], [address] [facility]    Nurse Only    1 year ago Pharyngitis, unspecified etiology    María Linn MD    Telemedicine    1 year ago Routine general medical examination at a health care facility    Earlene Mullins MD    Office Visit

## 2023-04-21 ENCOUNTER — OFFICE VISIT (OUTPATIENT)
Dept: INTERNAL MEDICINE CLINIC | Facility: CLINIC | Age: 68
End: 2023-04-21

## 2023-04-21 VITALS
HEIGHT: 63 IN | DIASTOLIC BLOOD PRESSURE: 82 MMHG | SYSTOLIC BLOOD PRESSURE: 126 MMHG | RESPIRATION RATE: 16 BRPM | WEIGHT: 166 LBS | BODY MASS INDEX: 29.41 KG/M2 | HEART RATE: 84 BPM

## 2023-04-21 DIAGNOSIS — E78.5 HYPERLIPIDEMIA, UNSPECIFIED HYPERLIPIDEMIA TYPE: ICD-10-CM

## 2023-04-21 DIAGNOSIS — E55.9 VITAMIN D DEFICIENCY: ICD-10-CM

## 2023-04-21 DIAGNOSIS — R73.03 PREDIABETES: Primary | ICD-10-CM

## 2023-04-21 PROCEDURE — 99214 OFFICE O/P EST MOD 30 MIN: CPT | Performed by: INTERNAL MEDICINE

## 2023-05-03 RX ORDER — ROSUVASTATIN CALCIUM 10 MG/1
10 TABLET, COATED ORAL NIGHTLY
Qty: 90 TABLET | Refills: 3 | Status: SHIPPED | OUTPATIENT
Start: 2023-05-03

## 2023-05-03 NOTE — TELEPHONE ENCOUNTER
Refill passed per [place] protocol.     .  Requested Prescriptions   Pending Prescriptions Disp Refills    ROSUVASTATIN 10 MG Oral Tab [Pharmacy Med Name: ROSUVASTATIN CALCIUM 10 MG TAB] 90 tablet 1     Sig: TAKE 1 TABLET BY MOUTH EVERY DAY AT NIGHT       Cholesterol Medication Protocol Passed - 5/3/2023 12:06 AM        Passed - ALT in past 12 months        Passed - LDL in past 12 months        Passed - Last ALT < 80     Lab Results   Component Value Date    ALT 30 01/18/2023             Passed - Last LDL < 130     Lab Results   Component Value Date    LDL 91 01/18/2023               Passed - In person appointment or virtual visit in the past 12 mos or appointment in next 3 mos     Recent Outpatient Visits              1 week ago Prediabetes    Nicolas Marley MD    Office Visit    3 months ago Mirna Mello annual wellness visit, subsequent    Randa Ortiz MD    Office Visit    6 months ago Suspected COVID-19 virus infection    [facility], [address], [place], Cipriano Flatness., APRN    Office Visit    1 year ago Pharyngitis, unspecified etiology    [address], [place], [address] [facility]    Nurse Only    1 year ago Pharyngitis, unspecified etiology    [facility], Tomas Dra[place], [address] Visits              1 week ago Prediabetes    Nicolas Marley MD    Office Visit    3 months ago Mirna Mello annual wellness visit, subsequent    Randa Ortiz MD    Office Visit    6 months ago Suspected COVID-19 virus infection    [address], [address], [place], Cipriano Flatness., APRN    Office Visit    1 year ago Pharyngitis, unspecified etiology    [address] [place], [address] [facility]    Nurse Only    1 year ago Pharyngitis, unspecified etiology    [address] [facility], Hussein Gomez MD    Telemedicine

## 2023-05-13 ENCOUNTER — APPOINTMENT (OUTPATIENT)
Dept: GENERAL RADIOLOGY | Age: 68
End: 2023-05-13
Attending: NURSE PRACTITIONER
Payer: MEDICARE

## 2023-05-13 ENCOUNTER — NURSE TRIAGE (OUTPATIENT)
Dept: INTERNAL MEDICINE CLINIC | Facility: CLINIC | Age: 68
End: 2023-05-13

## 2023-05-13 ENCOUNTER — HOSPITAL ENCOUNTER (OUTPATIENT)
Age: 68
Discharge: HOME | End: 2023-05-13
Payer: MEDICARE

## 2023-05-13 VITALS
WEIGHT: 166 LBS | TEMPERATURE: 98 F | DIASTOLIC BLOOD PRESSURE: 87 MMHG | BODY MASS INDEX: 29.41 KG/M2 | HEART RATE: 93 BPM | RESPIRATION RATE: 20 BRPM | SYSTOLIC BLOOD PRESSURE: 147 MMHG | OXYGEN SATURATION: 97 % | HEIGHT: 63 IN

## 2023-05-13 DIAGNOSIS — J06.9 UPPER RESPIRATORY VIRUS: Primary | ICD-10-CM

## 2023-05-13 LAB — SARS-COV-2 RNA RESP QL NAA+PROBE: NOT DETECTED

## 2023-05-13 PROCEDURE — 99204 OFFICE O/P NEW MOD 45 MIN: CPT

## 2023-05-13 PROCEDURE — 71046 X-RAY EXAM CHEST 2 VIEWS: CPT | Performed by: NURSE PRACTITIONER

## 2023-05-13 PROCEDURE — 99214 OFFICE O/P EST MOD 30 MIN: CPT

## 2023-05-13 RX ORDER — ALBUTEROL SULFATE 90 UG/1
2 AEROSOL, METERED RESPIRATORY (INHALATION) EVERY 4 HOURS PRN
Qty: 1 EACH | Refills: 0 | Status: SHIPPED | OUTPATIENT
Start: 2023-05-13 | End: 2023-06-12

## 2023-05-13 RX ORDER — FLUTICASONE PROPIONATE 50 MCG
2 SPRAY, SUSPENSION (ML) NASAL DAILY
Qty: 16 G | Refills: 0 | Status: SHIPPED | OUTPATIENT
Start: 2023-05-13 | End: 2023-06-12

## 2023-05-13 RX ORDER — BENZONATATE 100 MG/1
200 CAPSULE ORAL 3 TIMES DAILY PRN
Qty: 30 CAPSULE | Refills: 0 | Status: SHIPPED | OUTPATIENT
Start: 2023-05-13 | End: 2023-06-12

## 2023-05-13 NOTE — ED INITIAL ASSESSMENT (HPI)
Pt presents to the IC with c/o 2 weeks of nasal congestion, coupled with a cough that started 2-3 days ago when laying flat. Pt thinks she hears a wheeze when laying flat.

## 2023-05-13 NOTE — DISCHARGE INSTRUCTIONS
Push fluids. Rest.  Tylenol as needed for pain or fever. Take the medications as prescribed. Follow-up with your doctor. Return for any concerns.

## 2023-07-12 DIAGNOSIS — R73.03 PREDIABETES: ICD-10-CM

## 2023-07-12 NOTE — TELEPHONE ENCOUNTER
Patient is requesting a call from Dr Natalie Conley nurse. Patient is wondering if Dr Hakan Lopez still wants her to remain on the Metformin.      If she does want patient to keep taking medicine, then patient will need a refill, currently has 5 tabs left

## 2023-07-13 RX ORDER — METFORMIN HYDROCHLORIDE 500 MG/1
500 TABLET, EXTENDED RELEASE ORAL DAILY
Qty: 90 TABLET | Refills: 3 | Status: SHIPPED | OUTPATIENT
Start: 2023-07-13

## 2023-07-13 NOTE — TELEPHONE ENCOUNTER
Patient calling, confirmed name and . She has 5 days left of metformin therefore she is calling to confirm that the script has been sent to Dr. Araceli Aguirre. She also states that Dr. Araceli Aguirre advised her to get her labs drawn in October and to schedule her annual Medicare physical for January. Dr. Araceli Aguirre, she would like a call back only if you have different advice.

## 2023-07-13 NOTE — TELEPHONE ENCOUNTER
Refill passed per [place] [facility] protocol. Requested Prescriptions   Pending Prescriptions Disp Refills    metFORMIN  MG Oral Tablet 24 Hr 90 tablet 3     Sig: Take 1 tablet (500 mg total) by mouth daily.        Diabetes Medication Protocol Passed - 7/13/2023  9:45 AM        Passed - Last A1C < 7.5 and within past 6 months     Lab Results   Component Value Date    A1C 6.3 (H) 01/18/2023             Passed - In person appointment or virtual visit in the past 6 mos or appointment in next 3 mos     Recent Outpatient Visits              2 months ago Prediabetes    Alka Caballero MD    Office Visit    [facility]    [address] [facility], Heidi Mcallister MD    Office Visit    9 months ago Suspected COVID-19 virus infection    [facility], [address], [place], APRN    Office Visit    1 year ago Pharyngitis, unspecified etiology    [address], [address] [facility]    Nurse Only    1 year ago Pharyngitis, unspecified etiology    [address] [facility], rTistin Lozoya MD    Telemedicine                      Passed - EGFRCR or GFRNAA > 50     GFR Evaluation  EGFRCR: 83 , resulted on 1/18/2023          Passed - GFR in the past 12 months             Recent Outpatient Visits              2 months ago Prediabetes    Alka Caballero MD    Office Visit    5 months ago Mirna Mello annual wellness visit, subsequent    Hugo Albert MD    Office Visit    9 months ago Suspected COVID-19 virus infection    [facility], [address], [place], APRN    Office Visit    1 year ago Pharyngitis, unspecified etiology    [place], [facility], [place], [address] [facility]    Nurse Only    1 year ago Pharyngitis, unspecified etiology    [address] [facility], Charles Urbina MD    Telemedicine

## 2023-10-17 ENCOUNTER — OFFICE VISIT (OUTPATIENT)
Dept: OBGYN CLINIC | Facility: CLINIC | Age: 68
End: 2023-10-17
Payer: MEDICARE

## 2023-10-17 VITALS
SYSTOLIC BLOOD PRESSURE: 136 MMHG | HEART RATE: 76 BPM | BODY MASS INDEX: 30 KG/M2 | DIASTOLIC BLOOD PRESSURE: 83 MMHG | WEIGHT: 169 LBS

## 2023-10-17 DIAGNOSIS — Z01.419 ENCOUNTER FOR WELL WOMAN EXAM WITH ROUTINE GYNECOLOGICAL EXAM: Primary | ICD-10-CM

## 2023-10-17 PROCEDURE — G0101 CA SCREEN;PELVIC/BREAST EXAM: HCPCS | Performed by: OBSTETRICS & GYNECOLOGY

## 2023-10-17 NOTE — PROGRESS NOTES
Kaylan Ingram is a 79year old female  No LMP recorded. (Menstrual status: Menopause). Patient presents with:  Gyn Exam: New pt, annual  .  She has no complaints. Denies postmenopausal bleeding, urinary or sexual issues.       OBSTETRICS HISTORY:     OB History    Para Term  AB Living   2 2 2     2   SAB IAB Ectopic Multiple Live Births           2      # Outcome Date GA Lbr Tarik/2nd Weight Sex Delivery Anes PTL Lv   2 Term 5    M NORMAL SPONT   JUAN LUIS   1 Term 18    F NORMAL SPONT   JUAN LUIS       GYNE HISTORY:     Periods none due to menopause        Latest Ref Rng & Units 2019    10:06 AM   RECENT PAP RESULTS   Thinprep Pap Negative for intraepithelial lesion or malignancy Negative for intraepithelial lesion or malignancy    HPV Negative Negative          MEDICAL HISTORY:     Past Medical History:   Diagnosis Date    BRCA gene positive     [facility] 2013    bilateral masectomy / chemo / XRT     Past Surgical History:   Procedure Laterality Date    MASTECTOMY LEFT      MASTECTOMY RIGHT      REMOVAL OF OVARY(S) Bilateral     prophylatic     OB History     T2    L2    SAB0  IAB0  Ectopic0  Multiple0  Live Births2      SOCIAL HISTORY:     Social History    Socioeconomic History      Marital status:       Spouse name: Not on file      Number of children: Not on file      Years of education: Not on file      Highest education level: Not on file    Occupational History      Not on file    Tobacco Use      Smoking status: Never      Smokeless tobacco: Never    Vaping Use      Vaping Use: Never used    Substance and Sexual Activity      Alcohol use: No      Drug use: No      Sexual activity: Not Currently    Other Topics      Concerns:         Service: Not Asked        Blood Transfusions: Not Asked        Caffeine Concern: No        Occupational Exposure: Not Asked        Hobby Hazards: Not Asked        Sleep Concern: Not Asked Stress Concern: Not Asked        Weight Concern: Not Asked        Special Diet: Not Asked        Back Care: Not Asked        Exercise: Not Asked        Bike Helmet: Not Asked        Seat Belt: Not Asked        Self-Exams: Not Asked        Grew up on a farm: Not Asked        History of tanning: Not Asked        Outdoor occupation: Not Asked        Breast feeding: Not Asked        Reaction to local anesthetic: Not Asked    Social History Narrative      Not on file    Social Determinants of Health  Financial Resource Strain: Not on file  Food Insecurity: Not on file  Transportation Needs: Not on file  Physical Activity: Not on file  Stress: Not on file  Social Connections: Not on file  Housing Stability: Not on file    FAMILY HISTORY:     Family History   Problem Relation Age of Onset    Breast Cancer Mother 72    Breast Cancer Sister 50    Diabetes Sister     Breast Cancer Maternal Aunt 61       MEDICATIONS:       Current Outpatient Medications:     metFORMIN  MG Oral Tablet 24 Hr, Take 1 tablet (500 mg total) by mouth daily. , Disp: 90 tablet, Rfl: 3    rosuvastatin 10 MG Oral Tab, Take 1 tablet (10 mg total) by mouth nightly., Disp: 90 tablet, Rfl: 3    Vitamin D3, Cholecalciferol, 50 MCG (2000 UT) Oral Cap, Take 1 capsule (2,000 Units total) by mouth daily. , Disp: , Rfl: 0    Multiple Vitamin Oral Tab, Take  by mouth.  take 1 tablet by ORAL route  every day with food, Disp: , Rfl:     ALLERGIES:     No Known Allergies      REVIEW OF SYSTEMS:     Constitutional:    denies fever / chills  Eyes:     denies blurred or double vision  Cardiovascular:  denies chest pain or palpitations  Respiratory:    denies shortness of breath  Gastrointestinal:  denies severe abdominal pain, frequent diarrhea or constipation, nausea / vomiting  Genitourinary:    denies dysuria, bothersome incontinence  Skin/Breast:   denies any breast pain, lumps, or discharge  Neurological:    denies frequent severe headaches  Psychiatric: denies depression or anxiety, thoughts of harming self or others  Heme/Lymph:    denies easy bruising or bleeding    PHYSICAL EXAM:   Blood pressure 136/83, pulse 76, weight 169 lb (76.7 kg). Constitutional: well developed, well nourished  Head/Face: normocephalic  Neck/Thyroid: thyroid symmetric, no thyromegaly, no nodules, no adenopathy  Lymphatic:no abnormal supraclavicular or axillary adenopathy is noted  Breast: Bilateral masectomy scars  Respiratory:  nonlabored breathing  Cardiovascular: regular rate and rhythm  Abdomen:  soft, nontender, nondistended, no masses  Skin/Hair: no unusual rashes or bruises  Extremities: no edema, no cyanosis  Psychiatric:  Oriented to time, place, person and situation. Appropriate mood and affect    Pelvic Exam:  External Genitalia: normal appearance, hair distribution, and no lesions  Urethral Meatus:  normal in size, location, without lesions and prolapse  Bladder:  No fullness, masses or tenderness  Vagina:  Normal appearance without lesions, no abnormal discharge  Cervix:  Normal without tenderness on motion  Uterus: normal in size, contour, position, mobility, without tenderness  Adnexa: normal without masses or tenderness  Perineum: normal  Anus: no hemorroids    ASSESSMENT & PLAN:     Mag Westbrook was seen today for gyn exam.    Diagnoses and all orders for this visit:    Encounter for well woman exam with routine gynecological exam  -     ThinPrep PAP Smear  -     Hpv Dna  High Risk , Thin Prep Collect          SUMMARY:    Pap: Next cotest today -- if neg / neg, no more paps per ASCCP guidelines. Mammogram: No -- s/p bilateral masectomy    BCM: Postmenopausal    STD screening: declines    Colon cancer screening: UTD    Misc: Calcium needs reviewed (1500 mg diet + supplement). Weight bearing exercise encouraged.  Call if any VB (if perimenopausal, reviewed abn VB patterns)    HM updated    Depression screen:   Depression Screening (PHQ-2/PHQ-9): Over the LAST 2 WEEKS Little interest or pleasure in doing things (over the last two weeks)?: Not at all    Feeling down, depressed, or hopeless (over the last two weeks)?: Not at all    PHQ-2 SCORE: 0          FOLLOW-UP     Return in about 1 year (around 10/17/2024) for annual gyne exam.    Note to patient and family:  The [id number] makes medical notes available to patients in the interest of transparency. However, please be advised that this is a medical document. It is intended as a peer to peer communication. It is written in medical language and may contain abbreviations or verbiage that are technical and unfamiliar. It may appear blunt or direct. Medical documents are intended to carry relevant information, facts as evident, and the clinical opinion of the practitioner.

## 2023-10-18 LAB — HPV I/H RISK 1 DNA SPEC QL NAA+PROBE: NEGATIVE

## 2023-11-01 ENCOUNTER — OFFICE VISIT (OUTPATIENT)
Dept: DERMATOLOGY CLINIC | Facility: CLINIC | Age: 68
End: 2023-11-01
Payer: MEDICARE

## 2023-11-01 DIAGNOSIS — D22.9 MULTIPLE NEVI: ICD-10-CM

## 2023-11-01 DIAGNOSIS — D23.9 BENIGN NEOPLASM OF SKIN, UNSPECIFIED LOCATION: ICD-10-CM

## 2023-11-01 DIAGNOSIS — L57.0 AK (ACTINIC KERATOSIS): Primary | ICD-10-CM

## 2023-11-01 DIAGNOSIS — L82.1 SEBORRHEIC KERATOSES: ICD-10-CM

## 2023-11-01 DIAGNOSIS — L30.9 DERMATITIS: ICD-10-CM

## 2023-11-01 DIAGNOSIS — L81.4 LENTIGO: ICD-10-CM

## 2023-11-01 PROCEDURE — 99213 OFFICE O/P EST LOW 20 MIN: CPT | Performed by: DERMATOLOGY

## 2023-11-13 NOTE — PROGRESS NOTES
Willie Carrasco is a 79year old female. HPI:     CC:    Chief Complaint   Patient presents with    Full Skin Exam     LOV . Pt denies hx of skin CA. Pt has hx of full body exam. Pt denies any areas of concern. Allergies:  Patient has no known allergies. HISTORY:    Past Medical History:   Diagnosis Date    BRCA gene positive     Cancer of breast, female ([facility] 2013    bilateral masectomy / chemo / XRT    Hyperlipidemia 2019      Past Surgical History:   Procedure Laterality Date    COLONOSCOPY  2014    MASTECTOMY LEFT      MASTECTOMY RIGHT            REMOVAL OF OVARY(S) Bilateral     prophylatic      Family History   Problem Relation Age of Onset    Breast Cancer Mother 72    Cancer Mother     Breast Cancer Sister 50    Diabetes Sister     Breast Cancer Maternal Aunt 61      Social History     Socioeconomic History    Marital status:    Tobacco Use    Smoking status: Never    Smokeless tobacco: Never   Vaping Use    Vaping Use: Never used   Substance and Sexual Activity    Alcohol use: No    Drug use: No    Sexual activity: Not Currently   Other Topics Concern    Caffeine Concern No    Grew up on a farm No    History of tanning No    Outdoor occupation No    Breast feeding No    Reaction to local anesthetic No    Pt has a pacemaker No    Pt has a defibrillator No        Current Outpatient Medications   Medication Sig Dispense Refill    metFORMIN  MG Oral Tablet 24 Hr Take 1 tablet (500 mg total) by mouth daily. 90 tablet 3    rosuvastatin 10 MG Oral Tab Take 1 tablet (10 mg total) by mouth nightly. 90 tablet 3    Vitamin D3, Cholecalciferol, 50 MCG (2000 UT) Oral Cap Take 1 capsule (2,000 Units total) by mouth daily.  0    Multiple Vitamin Oral Tab Take  by mouth.  take 1 tablet by ORAL route  every day with food       Allergies:   No Known Allergies    Past Medical History:   Diagnosis Date    BRCA gene positive     Cancer of breast, female ([facility] 2013 bilateral masectomy / chemo / XRT    Hyperlipidemia 2019     Past Surgical History:   Procedure Laterality Date    COLONOSCOPY  2014    MASTECTOMY LEFT  2013    MASTECTOMY RIGHT            REMOVAL OF OVARY(S) Bilateral 2013    prophylatic     Social History     Socioeconomic History    Marital status:      Spouse name: Not on file    Number of children: Not on file    Years of education: Not on file    Highest education level: Not on file   Occupational History    Not on file   Tobacco Use    Smoking status: Never    Smokeless tobacco: Never   Vaping Use    Vaping Use: Never used   Substance and Sexual Activity    Alcohol use: No    Drug use: No    Sexual activity: Not Currently   Other Topics Concern     Service Not Asked    Blood Transfusions Not Asked    Caffeine Concern No    Occupational Exposure Not Asked    Hobby Hazards Not Asked    Sleep Concern Not Asked    Stress Concern Not Asked    Weight Concern Not Asked    Special Diet Not Asked    Back Care Not Asked    Exercise Not Asked    Bike Helmet Not Asked    Seat Belt Not Asked    Self-Exams Not Asked    Grew up on a farm No    History of tanning No    Outdoor occupation No    Breast feeding No    Reaction to local anesthetic No    Pt has a pacemaker No    Pt has a defibrillator No   Social History Narrative    Not on file     Social Determinants of Health     Financial Resource Strain: Not on file   Food Insecurity: Not on file   Transportation Needs: Not on file   Physical Activity: Not on file   Stress: Not on file   Social Connections: Not on file   Housing Stability: Not on file     Family History   Problem Relation Age of Onset    Breast Cancer Mother 72    Cancer Mother     Breast Cancer Sister 50    Diabetes Sister     Breast Cancer Maternal Aunt 59       There were no vitals filed for this visit. HPI:    Chief Complaint   Patient presents with    Full Skin Exam     LOV . Pt denies hx of skin CA.  Pt has hx of full body exam. Pt denies any areas of concern. New patient no personal family history of skin cancer. Has noted multiple skin lesions scaling. History of breast cancer postmastectomy    Follow-up history of AK's    Does use sunscreen  Lots of sun in the past.     Patient presents with concerns above. Patient has been in their usual state of health. History, medications, allergies reviewed as noted. ROS:  new relevant systemic complaints as noted       Physical Examination:     Well-developed well-nourished patient alert oriented in no acute distress. Exam total-body performed, including scalp, head, neck, face,nails, hair, external eyes, including conjunctival mucosa, eyelids, lips external ears, back, chest,/ breasts, axillae,  abdomen, arms, legs, palms. Multiple light to medium brown, well marginated, uniformly pigmented, macules and papules 6 mm and less scattered on exam. pigmented lesions examined with dermoscopy benign-appearing patterns. Waxy tannish keratotic papules scattered, cherry-red vascular papules scattered. See map today's date for lesions noted . Otherwise remarkable for lesions as noted on map. See details of examination  See Assessment /Plan for additional history and physical exam also:    Assessment / plan:    No orders of the defined types were placed in this encounter. Meds & Refills for this Visit:  Requested Prescriptions      No prescriptions requested or ordered in this encounter         Encounter Diagnoses   Name Primary? AK (actinic keratosis) Yes    Seborrheic keratoses     Benign neoplasm of skin, unspecified location     Multiple nevi     Lentigo     Dermatitis        See details on map. Remarkable for:    Patient with history of skin cancer. No evidence of recurrence. No new skin cancer. Dermatitis. Meds in grid. Skin care instructions reviewed. [place] use of emollients. Pathophysiology reviewed.   Consider Contac allergy in differential.  Consider patch testing. Patient will let us know how they are doing over the next several weeks. Await clinical response to above therapies. Dorsal hands, hydrocortisone, prescriptions steroid if worsening    Actinic damage with poikiloderma over the central chest splotchy erythema dyschromia observe carefully. Multiple lentigines over the brows observe carefully. Scattered nevi generalized observe. Verruca a over the hands observe consider trial of cryo. Medium brown macule 5 mm at right lateral thigh benign-appearing nevus on dermoscopy observation    Please refer to map for specific lesions. See additional diagnoses. Pros cons of various therapies, risks benefits discussed. Pathophysiology discussed with patient. Therapeutic options reviewed. See  Medications in grid. Instructions reviewed at length. Benign nevi, seborrheic  keratoses, cherry angiomas:  Reassurance regarding other benign skin lesions. Signs and symptoms of skin cancer, ABCDE's of melanoma discussed with patient. Sunscreen use, sun protection, self exams reviewed. Followup as noted RTC routine checkup 6 mos - one year or p.r.n. Encounter Times Including precharting, reviewing chart, prior notes obtaining history: 10 minutes, medical exam :10 minutes, notes on body map, plan, counseling 10minutes My total time spent caring for the patient on the day of the encounter: 30 minutes     The patient indicates understanding of these issues and agrees to the plan. The patient is asked to return as noted in follow-up/ above. This note was generated using Dragon voice recognition software. Please contact me regarding any confusion resulting from errors in recognition.

## 2023-11-21 ENCOUNTER — LAB ENCOUNTER (OUTPATIENT)
Dept: LAB | Age: 68
End: 2023-11-21
Attending: INTERNAL MEDICINE
Payer: MEDICARE

## 2023-11-21 LAB
ALBUMIN SERPL-MCNC: 4.5 G/DL (ref 3.2–4.8)
ALBUMIN/GLOB SERPL: 1.6 {RATIO} (ref 1–2)
ALP LIVER SERPL-CCNC: 81 U/L
ALT SERPL-CCNC: 19 U/L
ANION GAP SERPL CALC-SCNC: 5 MMOL/L (ref 0–18)
AST SERPL-CCNC: 20 U/L (ref ?–34)
BASOPHILS # BLD AUTO: 0.03 X10(3) UL (ref 0–0.2)
BASOPHILS NFR BLD AUTO: 0.6 %
BILIRUB SERPL-MCNC: 1 MG/DL (ref 0.2–1.1)
BILIRUB UR QL: NEGATIVE
BUN BLD-MCNC: 17 MG/DL (ref 9–23)
BUN/CREAT SERPL: 23.9 (ref 10–20)
CALCIUM BLD-MCNC: 9.8 MG/DL (ref 8.7–10.4)
CHLORIDE SERPL-SCNC: 104 MMOL/L (ref 98–112)
CHOLEST SERPL-MCNC: 158 MG/DL (ref ?–200)
CLARITY UR: CLEAR
CO2 SERPL-SCNC: 27 MMOL/L (ref 21–32)
CREAT BLD-MCNC: 0.71 MG/DL
DEPRECATED RDW RBC AUTO: 47.1 FL (ref 35.1–46.3)
EGFRCR SERPLBLD CKD-EPI 2021: 94 ML/MIN/1.73M2 (ref 60–?)
EOSINOPHIL # BLD AUTO: 0.15 X10(3) UL (ref 0–0.7)
EOSINOPHIL NFR BLD AUTO: 3.2 %
ERYTHROCYTE [DISTWIDTH] IN BLOOD BY AUTOMATED COUNT: 13.2 % (ref 11–15)
EST. AVERAGE GLUCOSE BLD GHB EST-MCNC: 128 MG/DL (ref 68–126)
FASTING PATIENT LIPID ANSWER: YES
FASTING STATUS PATIENT QL REPORTED: YES
GLOBULIN PLAS-MCNC: 2.9 G/DL (ref 2.8–4.4)
GLUCOSE BLD-MCNC: 104 MG/DL (ref 70–99)
GLUCOSE UR-MCNC: NORMAL MG/DL
HBA1C MFR BLD: 6.1 % (ref ?–5.7)
HCT VFR BLD AUTO: 38.4 %
HDLC SERPL-MCNC: 47 MG/DL (ref 40–59)
HGB BLD-MCNC: 13 G/DL
HGB UR QL STRIP.AUTO: NEGATIVE
IMM GRANULOCYTES # BLD AUTO: 0.01 X10(3) UL (ref 0–1)
IMM GRANULOCYTES NFR BLD: 0.2 %
KETONES UR-MCNC: NEGATIVE MG/DL
LDLC SERPL CALC-MCNC: 87 MG/DL (ref ?–100)
LEUKOCYTE ESTERASE UR QL STRIP.AUTO: NEGATIVE
LYMPHOCYTES # BLD AUTO: 1.45 X10(3) UL (ref 1–4)
LYMPHOCYTES NFR BLD AUTO: 31.1 %
MCH RBC QN AUTO: 32.5 PG (ref 26–34)
MCHC RBC AUTO-ENTMCNC: 33.9 G/DL (ref 31–37)
MCV RBC AUTO: 96 FL
MONOCYTES # BLD AUTO: 0.27 X10(3) UL (ref 0.1–1)
MONOCYTES NFR BLD AUTO: 5.8 %
NEUTROPHILS # BLD AUTO: 2.75 X10 (3) UL (ref 1.5–7.7)
NEUTROPHILS # BLD AUTO: 2.75 X10(3) UL (ref 1.5–7.7)
NEUTROPHILS NFR BLD AUTO: 59.1 %
NITRITE UR QL STRIP.AUTO: NEGATIVE
NONHDLC SERPL-MCNC: 111 MG/DL (ref ?–130)
OSMOLALITY SERPL CALC.SUM OF ELEC: 284 MOSM/KG (ref 275–295)
PH UR: 8 [PH] (ref 5–8)
PLATELET # BLD AUTO: 199 10(3)UL (ref 150–450)
POTASSIUM SERPL-SCNC: 4.2 MMOL/L (ref 3.5–5.1)
PROT SERPL-MCNC: 7.4 G/DL (ref 5.7–8.2)
PROT UR-MCNC: NEGATIVE MG/DL
RBC # BLD AUTO: 4 X10(6)UL
SODIUM SERPL-SCNC: 136 MMOL/L (ref 136–145)
SP GR UR STRIP: 1.01 (ref 1–1.03)
T4 FREE SERPL-MCNC: 1 NG/DL (ref 0.8–1.7)
TRIGL SERPL-MCNC: 138 MG/DL (ref 30–149)
TSI SER-ACNC: 2.25 MIU/ML (ref 0.55–4.78)
UROBILINOGEN UR STRIP-ACNC: NORMAL
VLDLC SERPL CALC-MCNC: 22 MG/DL (ref 0–30)
WBC # BLD AUTO: 4.7 X10(3) UL (ref 4–11)

## 2023-11-21 PROCEDURE — 84443 ASSAY THYROID STIM HORMONE: CPT | Performed by: INTERNAL MEDICINE

## 2023-11-21 PROCEDURE — 80053 COMPREHEN METABOLIC PANEL: CPT | Performed by: INTERNAL MEDICINE

## 2023-11-21 PROCEDURE — 81003 URINALYSIS AUTO W/O SCOPE: CPT | Performed by: INTERNAL MEDICINE

## 2023-11-21 PROCEDURE — 83036 HEMOGLOBIN GLYCOSYLATED A1C: CPT | Performed by: INTERNAL MEDICINE

## 2023-11-21 PROCEDURE — 84439 ASSAY OF FREE THYROXINE: CPT | Performed by: INTERNAL MEDICINE

## 2023-11-21 PROCEDURE — 80061 LIPID PANEL: CPT | Performed by: INTERNAL MEDICINE

## 2023-11-21 PROCEDURE — 85025 COMPLETE CBC W/AUTO DIFF WBC: CPT | Performed by: INTERNAL MEDICINE

## 2023-11-21 PROCEDURE — 36415 COLL VENOUS BLD VENIPUNCTURE: CPT | Performed by: INTERNAL MEDICINE

## 2024-01-19 ENCOUNTER — TELEPHONE (OUTPATIENT)
Dept: INTERNAL MEDICINE CLINIC | Facility: CLINIC | Age: 69
End: 2024-01-19

## 2024-01-19 ENCOUNTER — OFFICE VISIT (OUTPATIENT)
Dept: INTERNAL MEDICINE CLINIC | Facility: CLINIC | Age: 69
End: 2024-01-19
Payer: MEDICARE

## 2024-01-19 VITALS
HEART RATE: 93 BPM | SYSTOLIC BLOOD PRESSURE: 136 MMHG | HEIGHT: 63 IN | WEIGHT: 169 LBS | DIASTOLIC BLOOD PRESSURE: 82 MMHG | BODY MASS INDEX: 29.95 KG/M2

## 2024-01-19 DIAGNOSIS — Z00.00 ENCOUNTER FOR ANNUAL HEALTH EXAMINATION: ICD-10-CM

## 2024-01-19 DIAGNOSIS — E78.5 HYPERLIPIDEMIA, UNSPECIFIED HYPERLIPIDEMIA TYPE: ICD-10-CM

## 2024-01-19 DIAGNOSIS — Z00.00 MEDICARE ANNUAL WELLNESS VISIT, SUBSEQUENT: Primary | ICD-10-CM

## 2024-01-19 DIAGNOSIS — Z12.11 SCREENING FOR COLON CANCER: ICD-10-CM

## 2024-01-19 DIAGNOSIS — Z85.3 HISTORY OF BREAST CANCER: ICD-10-CM

## 2024-01-19 DIAGNOSIS — R73.03 PREDIABETES: ICD-10-CM

## 2024-01-19 DIAGNOSIS — E55.9 VITAMIN D DEFICIENCY: ICD-10-CM

## 2024-01-19 PROCEDURE — G0439 PPPS, SUBSEQ VISIT: HCPCS | Performed by: INTERNAL MEDICINE

## 2024-01-19 NOTE — TELEPHONE ENCOUNTER
Pt has 2 orders for labs and wanted them to be switched to [facility]. Pt stts she does not use [facility]. Please advise

## 2024-01-28 NOTE — PATIENT INSTRUCTIONS
Philomena Kwon's SCREENING SCHEDULE   Tests on this list are recommended by your physician but may not be covered, or covered at this frequency, by your insurer.   Please check with your insurance carrier before scheduling to verify coverage.   PREVENTATIVE SERVICES FREQUENCY &  COVERAGE DETAILS LAST COMPLETION DATE   Diabetes Screening    Fasting Blood Sugar /  Glucose    One screening every 12 months if never tested or if previously tested but not diagnosed with pre-diabetes   One screening every 6 months if diagnosed with pre-diabetes Lab Results   Component Value Date     (H) 11/21/2023        Cardiovascular Disease Screening    Lipid Panel  Cholesterol  Lipoprotein (HDL)  Triglycerides Covered every 5 years for all Medicare beneficiaries without apparent signs or symptoms of cardiovascular disease Lab Results   Component Value Date    CHOLEST 158 11/21/2023    HDL 47 11/21/2023    LDL 87 11/21/2023    TRIG 138 11/21/2023         Electrocardiogram (EKG)   Covered if needed at Welcome to Medicare, and non-screening if indicated for medical reasons -      Ultrasound Screening for Abdominal Aortic Aneurysm (AAA) Covered once in a lifetime for one of the following risk factors   • Men who are 65-75 years old and have ever smoked   • Anyone with a family history -     Colorectal Cancer Screening  Covered for ages 50-85; only need ONE of the following:    Colonoscopy   Covered every 10 years    Covered every 2 years if patient is at high risk or previous colonoscopy was abnormal 07/24/2014    Health Maintenance   Topic Date Due   • Colorectal Cancer Screening  07/24/2024       Flexible Sigmoidoscopy   Covered every 4 years -    Fecal Occult Blood Test Covered annually -   Bone Density Screening    Bone density screening    Covered every 2 years after age 65 if diagnosed with risk of osteoporosis or estrogen deficiency.    Covered yearly for long-term glucocorticoid medication use (Steroids) Last Dexa  Scan:    XR DEXA BONE DENSITOMETRY (CPT=77080) 02/01/2023      No recommendations at this time   Pap and Pelvic    Pap   Covered every 2 years for women at normal risk; Annually if at high risk 10/17/2023  No recommendations at this time    Chlamydia Annually if high risk -  No recommendations at this time   Screening Mammogram    Mammogram     Recommend annually for all female patients aged 40 and older    One baseline mammogram covered for patients aged 35-39 -    No recommendations at this time    Immunizations    Influenza Covered once per flu season  Please get every year 11/02/2023  No recommendations at this time    Pneumococcal Each vaccine ([id number] & [id number]) covered once after 65 Prevnar 13: 11/19/2019    [id number]: 04/19/2013     No recommendations at this time    Hepatitis B One screening covered for patients with certain risk factors   -  No recommendations at this time    Tetanus Toxoid Not covered by Medicare Part B unless medically necessary (cut with metal); may be covered with your pharmacy prescription benefits -    Tetanus, Diptheria and Pertusis TD and TDaP Not covered by Medicare Part B -  No recommendations at this time    Zoster Not covered by Medicare Part B; may be covered with your pharmacy  prescription benefits -  No recommendations at this time

## 2024-01-28 NOTE — PROGRESS NOTES
Subjective:   Philomena Kwon is a 68 year old female who presents for a Medicare Subsequent Annual Wellness visit (Pt already had Initial Annual Wellness) and scheduled follow up of multiple significant but stable problems.       History/Other:   Fall Risk Assessment:   She has been screened for Falls and is low risk.      Cognitive Assessment:   She had a completely normal cognitive assessment - see flowsheet entries     Functional Ability/Status:   Philomena Kwon has a completely normal functional assessment. See flowsheet for details.      Depression Screening (PHQ-2/PHQ-9): PHQ-2 SCORE: 0  , done 1/19/2024        /82   Pulse 93   Ht 5' 3\" (1.6 m)   Wt 169 lb (76.7 kg)   BMI 29.94 kg/m²   Wt Readings from Last 6 Encounters:   01/19/24 169 lb (76.7 kg)   10/17/23 169 lb (76.7 kg)   05/13/23 166 lb (75.3 kg)   04/21/23 166 lb (75.3 kg)   01/18/23 174 lb (78.9 kg)   10/15/22 171 lb (77.6 kg)     Body mass index is 29.94 kg/m².  HGBA1C:    Lab Results   Component Value Date    A1C 6.1 (H) 11/21/2023    A1C 6.3 (H) 01/18/2023    A1C 6.1 (H) 12/23/2021     (H) 11/21/2023         Advanced Directives:   She does NOT have a Living Will. [ ]  She does NOT have a Power of  for Health Care. [ ]  Discussed Advance Care Planning with patient (and family/surrogate if present). Standard forms made available to patient in After Visit Summary.      Patient Active Problem List   Diagnosis    History of breast cancer    Status post bilateral mastectomy     Allergies:  She has No Known Allergies.    Current Medications:  Outpatient Medications Marked as Taking for the 1/19/24 encounter (Office Visit) with Barbara Pike MD   Medication Sig    metFORMIN  MG Oral Tablet 24 Hr Take 1 tablet (500 mg total) by mouth daily.    rosuvastatin 10 MG Oral Tab Take 1 tablet (10 mg total) by mouth nightly.    Vitamin D3, Cholecalciferol, 50 MCG (2000 UT) Oral Cap Take 1 capsule (2,000 Units total) by mouth  daily.    Multiple Vitamin Oral Tab Take  by mouth. take 1 tablet by ORAL route  every day with food       Medical History:  She  has a past medical history of BRCA gene positive, Cancer of breast, female (HCC) (2013), and Hyperlipidemia (2019).  Surgical History:  She  has a past surgical history that includes removal of ovary(s) (Bilateral, ); mastectomy left (); mastectomy right (); colonoscopy (2014); and .   Family History:  Her family history includes Breast Cancer (age of onset: 48) in her sister; Breast Cancer (age of onset: 59) in her maternal aunt; Breast Cancer (age of onset: 65) in her mother; Cancer in her mother; Diabetes in her sister.  Social History:  She  reports that she has never smoked. She has never been exposed to tobacco smoke. She has never used smokeless tobacco. She reports that she does not drink alcohol and does not use drugs.    Tobacco:  She has never smoked tobacco.    CAGE Alcohol Screen:   CAGE screening score of 0 on 2024, showing low risk of alcohol abuse.      Patient Care Team:  Barbara Pike MD as PCP - General (Internal Medicine)  Cuong Parks MD (Radiation Oncology)  Ernestina Baires MD (OBSTETRICS & GYNECOLOGY)    Review of Systems   Constitutional:  Negative for activity change, chills, fatigue and fever.   HENT:  Negative for ear discharge, nosebleeds, postnasal drip, rhinorrhea, sinus pressure and sore throat.    Eyes:  Negative for pain, discharge and redness.   Respiratory:  Negative for cough, chest tightness, shortness of breath and wheezing.    Cardiovascular:  Negative for chest pain, palpitations and leg swelling.   Gastrointestinal:  Negative for abdominal pain, blood in stool, constipation, diarrhea, nausea and vomiting.   Genitourinary:  Negative for difficulty urinating, dysuria, frequency, hematuria and urgency.   Musculoskeletal:  Negative for back pain, gait problem and joint swelling.   Skin:  Negative for rash.    Neurological:  Negative for syncope, weakness, light-headedness and headaches.   Psychiatric/Behavioral:  Negative for dysphoric mood. The patient is not nervous/anxious.          Objective:   Physical Exam  Constitutional:       Appearance: She is well-developed. She is not ill-appearing.   HENT:      Right Ear: Ear canal normal.      Left Ear: Ear canal normal.      Mouth/Throat:      Pharynx: Oropharynx is clear.   Eyes:      Extraocular Movements: Extraocular movements intact.      Conjunctiva/sclera: Conjunctivae normal.      Pupils: Pupils are equal, round, and reactive to light.   Cardiovascular:      Rate and Rhythm: Normal rate and regular rhythm.      Heart sounds: Normal heart sounds.   Pulmonary:      Effort: Pulmonary effort is normal.      Breath sounds: Normal breath sounds.   Abdominal:      General: Bowel sounds are normal.      Palpations: Abdomen is soft.   Skin:     General: Skin is warm and dry.   Neurological:      Mental Status: She is alert.   Psychiatric:         Mood and Affect: Mood normal.           /82   Pulse 93   Ht 5' 3\" (1.6 m)   Wt 169 lb (76.7 kg)   BMI 29.94 kg/m²  Estimated body mass index is 29.94 kg/m² as calculated from the following:    Height as of this encounter: 5' 3\" (1.6 m).    Weight as of this encounter: 169 lb (76.7 kg).    Medicare Hearing Assessment:   Hearing Screening    Entry User: Winnie Zepeda CMA  Screening Method: Questionnaire  I have a problem hearing over the telephone: No I have trouble following the conversations when two or more people are talking at the same time: No   I have trouble understanding things on the TV: No I have to strain to understand conversations: No   I have to worry about missing the telephone ring or doorbell: No I have trouble hearing conversations in a noisy background such as a crowded room or restaurant: No   I get confused about where sounds come from: No I misunderstand some words in a sentence and need to ask  people to repeat themselves: No   I especially have trouble understanding the speech of women and children: No I have trouble understanding the speaker in a large room such as at a meeting or place of Yazidi: No   Many people I talk to seem to mumble (or don't speak clearly): No People get annoyed because I misunderstand what they say: No   I misunderstand what others are saying and make inappropriate responses: No I avoid social activities because I cannot hear well and fear I will reply improperly: No   Family members and friends have told me they think I may have hearing loss: No           Component      Latest Ref Rn 11/21/2023   WBC      4.0 - 11.0 x10(3) uL 4.7    RBC      3.80 - 5.30 x10(6)uL 4.00    Hemoglobin      12.0 - 16.0 g/dL 13.0    Hematocrit      35.0 - 48.0 % 38.4    MCV      80.0 - 100.0 fL 96.0    MCH      26.0 - 34.0 pg 32.5    MCHC      31.0 - 37.0 g/dL 33.9    RDW-SD      35.1 - 46.3 fL 47.1 (H)    RDW      11.0 - 15.0 % 13.2    Platelet Count      150.0 - 450.0 10(3)uL 199.0    Prelim Neutrophil Abs      1.50 - 7.70 x10 (3) uL 2.75    Neutrophils Absolute      1.50 - 7.70 x10(3) uL 2.75    Lymphocytes Absolute      1.00 - 4.00 x10(3) uL 1.45    Monocytes Absolute      0.10 - 1.00 x10(3) uL 0.27    Eosinophils Absolute      0.00 - 0.70 x10(3) uL 0.15    Basophils Absolute      0.00 - 0.20 x10(3) uL 0.03    Immature Granulocyte Absolute      0.00 - 1.00 x10(3) uL 0.01    Neutrophils %      % 59.1    Lymphocytes %      % 31.1    Monocytes %      % 5.8    Eosinophils %      % 3.2    Basophils %      % 0.6    Immature Granulocyte %      % 0.2    Glucose      70 - 99 mg/dL 104 (H)    Sodium      136 - 145 mmol/L 136    Potassium      3.5 - 5.1 mmol/L 4.2    Chloride      98 - 112 mmol/L 104    Carbon Dioxide, Total      21.0 - 32.0 mmol/L 27.0    ANION GAP      0 - 18 mmol/L 5    BUN      9 - 23 mg/dL 17    CREATININE      0.55 - 1.02 mg/dL 0.71    BUN/CREATININE RATIO      10.0 - 20.0  23.9 (H)     CALCIUM      8.7 - 10.4 mg/dL 9.8    CALCULATED OSMOLALITY      275 - 295 mOsm/kg 284    EGFR      >=60 mL/min/1.73m2 94    ALT (SGPT)      10 - 49 U/L 19    AST (SGOT)      <=34 U/L 20    ALKALINE PHOSPHATASE      55 - 142 U/L 81    Total Bilirubin      0.2 - 1.1 mg/dL 1.0    PROTEIN, TOTAL      5.7 - 8.2 g/dL 7.4    Albumin      3.2 - 4.8 g/dL 4.5    Globulin      2.8 - 4.4 g/dL 2.9    A/G Ratio      1.0 - 2.0  1.6    Patient Fasting for CMP? Yes    Color Urine      Yellow  Light-Yellow    Clarity Urine      Clear  Clear    Spec Gravity      1.005 - 1.030  1.015    Glucose Urine      Normal mg/dL Normal    Bilirubin Urine      Negative  Negative    Ketones, UA      Negative mg/dL Negative    Blood Urine      Negative  Negative    PH Urine      5.0 - 8.0  8.0    Protein Urine      Negative mg/dL Negative    Urobilinogen Urine      Normal  Normal    Nitrite Urine      Negative  Negative    Leukocyte Esterase       Negative  Negative    Microscopic Microscopic not indicated    Cholesterol, Total      <200 mg/dL 158    HDL Cholesterol      40 - 59 mg/dL 47    Triglycerides      30 - 149 mg/dL 138    LDL Cholesterol Calc      <100 mg/dL 87    VLDL      0 - 30 mg/dL 22    NON-HDL CHOLESTEROL      <130 mg/dL 111    Patient Fasting for Lipid? Yes    HEMOGLOBIN A1c      <5.7 % 6.1 (H)    ESTIMATED AVERAGE GLUCOSE      68 - 126 mg/dL 128 (H)    T4,Free (Direct)      0.8 - 1.7 ng/dL 1.0    TSH      0.550 - 4.780 mIU/mL 2.254       Legend:  (H) High        Assessment & Plan:   Philomena Kwon is a 68 year old female who presents for a Medicare Assessment.     (Z00.00) Medicare annual wellness visit, subsequent  (primary encounter diagnosis)  Plan:Patient is independent with [employer].s    Health screening   Colonoscopy, , dexa scan  And routine eye exam advised.  [Hx of bilateral mastectomy    (E55.9) Vitamin D deficiency  Plan: supplement    (E78.5) Hyperlipidemia, unspecified hyperlipidemia type  Plan: Low cholesterol diet  advised  Avoid saturated and trans fats       (R73.03) Prediabetes  Plan: Hemoglobin A1C, Basic Metabolic Panel (8),         CANCELED: Basic Metabolic Panel (8), CANCELED:         Hemoglobin A1C        Limit carb intake    (Z85.3) History of breast cancer  Plan: s/p bilat mastectomy doing well    (Z12.11) Screening for colon cancer  Plan: GASTRO - INTERNAL        Referral given    Asymptomatic  monitor    Patient voiced understanding  and agrees with plan        The patient indicates understanding of these issues and agrees to the plan.        No follow-ups on file.     Barbara Pike MD, 1/28/2024     Supplementary Documentation:   General Health:  At any time do you feel concerned for the safety/well-being of yourself and/or your children, in your home or elsewhere?: Marita Kwon's SCREENING SCHEDULE   Tests on this list are recommended by your physician but may not be covered, or covered at this frequency, by your insurer.   Please check with your insurance carrier before scheduling to verify coverage.   PREVENTATIVE SERVICES FREQUENCY &  COVERAGE DETAILS LAST COMPLETION DATE   Diabetes Screening    Fasting Blood Sugar /  Glucose    One screening every 12 months if never tested or if previously tested but not diagnosed with pre-diabetes   One screening every 6 months if diagnosed with pre-diabetes Lab Results   Component Value Date     (H) 11/21/2023        Cardiovascular Disease Screening    Lipid Panel  Cholesterol  Lipoprotein (HDL)  Triglycerides Covered every 5 years for all Medicare beneficiaries without apparent signs or symptoms of cardiovascular disease Lab Results   Component Value Date    CHOLEST 158 11/21/2023    HDL 47 11/21/2023    LDL 87 11/21/2023    TRIG 138 11/21/2023         Electrocardiogram (EKG)   Covered if needed at Welcome to Medicare, and non-screening if indicated for medical reasons -      Ultrasound Screening for Abdominal Aortic Aneurysm (AAA) Covered once in a  lifetime for one of the following risk factors    Men who are 65-75 years old and have ever smoked    Anyone with a family history -     Colorectal Cancer Screening  Covered for ages 50-85; only need ONE of the following:    Colonoscopy   Covered every 10 years    Covered every 2 years if patient is at high risk or previous colonoscopy was abnormal 07/24/2014    Health Maintenance   Topic Date Due    Colorectal Cancer Screening  07/24/2024       Flexible Sigmoidoscopy   Covered every 4 years -    Fecal Occult Blood Test Covered annually -   Bone Density Screening    Bone density screening    Covered every 2 years after age 65 if diagnosed with risk of osteoporosis or estrogen deficiency.    Covered yearly for long-term glucocorticoid medication use (Steroids) Last Dexa Scan:    XR DEXA BONE DENSITOMETRY (CPT=77080) 02/01/2023      No recommendations at this time   Pap and Pelvic    Pap   Covered every 2 years for women at normal risk; Annually if at high risk 10/17/2023  No recommendations at this time    Chlamydia Annually if high risk -  No recommendations at this time   Screening Mammogram    Mammogram     Recommend annually for all female patients aged 40 and older    One baseline mammogram covered for patients aged 35-39 -    No recommendations at this time    Immunizations    Influenza Covered once per flu season  Please get every year 11/02/2023  No recommendations at this time    Pneumococcal Each vaccine ([id number] & [id number]) covered once after 65 Prevnar 13: 11/19/2019    [id number]: 04/19/2013     No recommendations at this time    Hepatitis B One screening covered for patients with certain risk factors   -  No recommendations at this time    Tetanus Toxoid Not covered by Medicare Part B unless medically necessary (cut with metal); may be covered with your pharmacy prescription benefits -    Tetanus, Diptheria and Pertusis TD and TDaP Not covered by Medicare Part B -  No recommendations at this time     Zoster Not covered by Medicare Part B; may be covered with your pharmacy  prescription benefits -  No recommendations at this time

## 2024-02-02 ENCOUNTER — NURSE ONLY (OUTPATIENT)
Facility: CLINIC | Age: 69
End: 2024-02-02

## 2024-02-02 DIAGNOSIS — Z12.11 COLON CANCER SCREENING: Primary | ICD-10-CM

## 2024-02-02 NOTE — PROGRESS NOTES
Dr. Heard -    Called patient for TCS and it ended up being a recall  68 year old woman who had a colonoscopy in 2014 with Dr. Houston  Normal colonoscopy - recall 10 years (op report below)    Please advise on orders  Thank you

## 2024-02-02 NOTE — PROGRESS NOTES
Called patient for scheduled telephone colon screening  Medications, pharmacy, and allergies verified with the patient.      Age 45-66 y/o (Y/N):   66-74 y/o ? Route to GI provider per rotation schedule   › GI MD preference:   › Insurance:  MEDICARE  › Last PCP Visit: 1/19/2024  › Last CBC drawn: 11/21/2023  › H/W/BMI: 5'3\" / 169 LBS / 29.94    Special comments/notes:  Last colonoscopy in 7/24/2014 by Dr. Houston  Post-op Dx: normal colonscopy ; recall 10 years  Miralax/gatorade - Quality of prep: excellent  On metformin    Telephone colon screening Questionnaires:  Yes No   Are you currently experiencing any new GI symptoms? [] [x]   If yes, symptom details:     Rectal Bleeding with or without bowel movements: [] [x]   Black stool: [] [x]   Dysphagia &Food feeling/getting stuck: [] [x]   Intractable Vomiting: [] [x]   Unexplained weight loss: [] [x]   First colonoscopy? [] [x]   Family history of colon cancer? [] [x]   Any issues with anesthesia? [] [x]   If yes, explain details:      Personal history of Resp. Issues/Oxygen Use/JONNY/COPD? [] [x]   If yes, CPAP/BiPAP? [] [x]   History of devices Pacemaker/Defibrillator/Stents? [] [x]   History of Cardiac/CVA issues/(MI/Stroke):  [] [x]     Medication usage:  Yes  No   Anticoagulants:  Anticoagulant (Except Aspirin) ? Route to RN staff to obtain ordering provider orders [] [x]   Diabetic Meds:   PO DM Meds ? Hold day prior and day of procedure  Insulin ? Route to RN clinical staff to obtain provider orders  [x] []   Weight loss meds (phentermine/Vyvanse/Saxsenda): [] [x]   Iron/Herbal/Multivitamin Supplement (RX/OTC):  Multivitamin and d3 [x] []   Usage of marijuana, CBD &/or vape products: [] [x]        Patient:   MARY GUAMAN  CHECO #:   [id number]                    Room: CARMINA Elizalde #:  [id number]     ADMITTED:   07/24/2014        GI PROCEDURE     DATE:  07/24/2014     PROCEDURE: Colonoscopy.     SURGEON:   Alanis Houston MD     ATTENDING:  Barbara Pike,  M.D.     PREOPERATIVE DIAGNOSIS:  Colorectal cancer screening.     POSTOPERATIVE DIAGNOSES:  1.  Normal colonoscopy to terminal ileum.  2.  Small internal hemorrhoids.     BRIEF HISTORY:  This is a 59-year-old female who presented for   colorectal  cancer screening.  Informed consent was obtained after explanation   of the  risks, benefits and alternatives of the procedure to the patient.     PREOPERATIVE SEDATION:  Fentanyl 50 mcg IV push, Versed 2.0 mg IV   push.     INTRAOPERATIVE SEDATION:  Fentanyl 50 mcg IV push in 25 mcg   divided  increments, Versed 4.0 mg IV push in 1 and 2 mg divided   increments.     FINDINGS AND TECHNIQUE:  The patient was placed in the left   lateral  decubitus position.  Preoperative sedation was administered.  Then   rectal  digital examination was performed revealing a normal sphincter   tone and no  masses.  The colonoscope was then inserted and advanced without   difficulty  to the terminal ileum and then slowly withdrawn,.  The entire   colon was  carefully examined.  Findings were as follows.     The colonic prep was excellent.  There were no polyps, mass   lesions, AVMs,  diverticula or mucosal abnormalities identified.  Retroflexion in   the  rectum revealed small internal hemorrhoids.  The patient tolerated   the  procedure well without immediate complication.     IMPRESSION:  1.  Normal colonoscopy to the terminal ileum.  2.  Small internal hemorrhoids.     RECOMMENDATION:  Next screening colonoscopy in 10 years, unless   other signs  or symptoms develop in the interim.

## 2024-02-05 RX ORDER — SODIUM, POTASSIUM,MAG SULFATES 17.5-3.13G
SOLUTION, RECONSTITUTED, ORAL ORAL
Qty: 1 EACH | Refills: 0 | Status: SHIPPED | OUTPATIENT
Start: 2024-02-05

## 2024-02-05 NOTE — PROGRESS NOTES
Dx: average risk crc screening  Colonoscopy with MAC sedation  Split dose suprep  Please review prep instructions with patient    - If the patient is taking oral diabetic medications then they should HOLD diabetic medications the night before and/or the day of the procedure   - If the patient is on insulin, please have the PCP or endocrinologist assist with management of dosing prior to the procedure.  - NO herbal supplements or weight loss medications x 7 days prior to the procedure.  - If patient is taking Xarelto OR Eliquis then it needs to be helf for 48 hours prior to the procedure --> Should get approval from the prescribing physician (PCP or cardiologist) to hold this medication prior to the procedure.  - If the patient is taking Coumadin then it needs to be on hold Coumadin for 5 days prior to the procedure --> Should get approval from the prescribing physician (PCP or cardiologist) to hold this medication prior to the procedure.  - If the patient takes Plavix/Brilinta then it needs to be held for 5 days prior to the procedure --> Should get approval from the prescribing physician (PCP or cardiologist) to hold this medication prior to the procedure.    *If the bowel prep prescribed is too expensive/not covered by the patient's insurance please pend an alternative and I will sign that order.    Thank you.

## 2024-02-09 NOTE — PROGRESS NOTES
Dr. Sweeney -     Patient's  Mr. Kwon is a patient of yours and would like his wife to see you for her colonoscopy. Is it ok to schedule this TCS with you?    Please advise - thank you,    Marie

## 2024-02-23 NOTE — PROGRESS NOTES
Dr Sweeney     I scheduled the patient today  and she is requesting Miralax/Gatorade prep     Please advise if the patient can do this prep  Procedure scheduled on 5/8/2024 at [facility]    Thank you!

## 2024-02-23 NOTE — PROGRESS NOTES
Provided the patient does not have a history of constipation or prior suboptimal colon preparation, she may utilize a MiraLAX/Gatorade preparation.

## 2024-02-23 NOTE — PROGRESS NOTES
Spoke with the patient and asked if they have ever had a problem with constipation or bad bowel prep and they denied any problems     The patient stated that she did the Mirlaxa / Gatorade for the last prep and had no problem     Miralax/Gatorade prep instructions were sent via [employer]

## 2024-02-23 NOTE — PROGRESS NOTES
Scheduled for:  Colonoscopy 19963  Provider Name:  Dr Sweeney  Date:  5/8/2024  [facility]  Sedation:  MAC  Time:  815 (pt is aware to arrive at 715  Prep:  Suprep ordered - patient request Miralax gatorade  Meds/Allergies Reconciled?:  Physician reviewed   Diagnosis with codes:    Was patient informed to call insurance with codes (Y/N):  Yes, I confirmed the insurance with the patient.   Referral sent?:  N/A  EM or [facility] notified?:  I sent an electronic request to Endo Scheduling and received a confirmation today.      Medication Orders:  This patient verbally confirmed that she is not taking:   Iron, blood thinners, BP meds, and is  diabetic   Not latex allergy, Not PCN allergy and does not have a pacemaker  Patient is aware to HOLD Metformin the day before and the day of the procedure   Patient is aware to NOT take iron pills, herbal meds and diet supplements for 7 days before exam. Also to NOT take any form of alcohol, recreational drugs and any forms of ED meds 24-72 hours before exam.       Misc Orders:  I discussed the prep instructions with the patient over the phone  which she verbally understood and is aware that I will Mychart the instructions today.      Further instructions given by staff:

## 2024-04-08 ENCOUNTER — TELEPHONE (OUTPATIENT)
Dept: DERMATOLOGY CLINIC | Facility: CLINIC | Age: 69
End: 2024-04-08

## 2024-04-08 NOTE — TELEPHONE ENCOUNTER
Dr Magaña,    Please see patient photos and messages. Patient concerned this is eczema, not currently using any emollients. Please advise next steps, thank you!

## 2024-04-08 NOTE — TELEPHONE ENCOUNTER
Pt states the last time she was in office was told eczema on hand now it on her face. Want know if dr can call a med in for her

## 2024-04-09 RX ORDER — TRIAMCINOLONE ACETONIDE 5 MG/G
CREAM TOPICAL
Qty: 30 G | Refills: 0 | Status: SHIPPED | OUTPATIENT
Start: 2024-04-09

## 2024-04-09 NOTE — TELEPHONE ENCOUNTER
Tac 0.5% cream sent apply bid , if not improving in 2-3 weeks, suggest discontinuing  tac and scheduling f/u appt

## 2024-04-19 RX ORDER — ROSUVASTATIN CALCIUM 10 MG/1
10 TABLET, COATED ORAL NIGHTLY
Qty: 90 TABLET | Refills: 3 | Status: SHIPPED | OUTPATIENT
Start: 2024-04-19

## 2024-04-19 NOTE — TELEPHONE ENCOUNTER
Refill passed per [facility] protocol.     Requested Prescriptions   Pending Prescriptions Disp Refills    ROSUVASTATIN 10 MG Oral Tab [Pharmacy Med Name: ROSUVASTATIN CALCIUM 10 MG TAB] 90 tablet 3     Sig: TAKE 1 TABLET BY MOUTH EVERY DAY AT NIGHT       Cholesterol Medication Protocol Passed - 4/19/2024 12:13 AM        Passed - ALT < 80     Lab Results   Component Value Date    ALT 19 11/21/2023             Passed - ALT resulted within past year        Passed - Lipid panel within past 12 months     Lab Results   Component Value Date    CHOLEST 158 11/21/2023    TRIG 138 11/21/2023    HDL 47 11/21/2023    LDL 87 11/21/2023    VLDL 22 11/21/2023    TCHDLRATIO 3.4 12/23/2021    NONHDLC 111 11/21/2023             Passed - In person appointment or virtual visit in the past 12 mos or appointment in next 3 mos     Recent Outpatient Visits              2 months ago Colon cancer screening    [place]    Nurse Only    3 months [facility]    5 months ago AK (actinic keratosis)    [facility] Melinda Magaña MD    Office Visit    6 months ago Encounter for well woman exam with routine gynecological exam    [place] - OB/GYN Ernestina Baires MD    Office Visit    12 months ago [facility]iabetes    [facility]    [facility] Visit          Future Appointments         Provider Department Appt Notes    In 2 weeks [email or web address]    In 6 months Melinda Magaña MD [facility] skin check

## 2024-05-08 ENCOUNTER — ANESTHESIA (OUTPATIENT)
Dept: ENDOSCOPY | Age: 69
End: 2024-05-08
Payer: MEDICARE

## 2024-05-08 ENCOUNTER — HOSPITAL ENCOUNTER (OUTPATIENT)
Age: 69
Setting detail: HOSPITAL OUTPATIENT SURGERY
Discharge: HOME | End: 2024-05-08
Attending: INTERNAL MEDICINE | Admitting: INTERNAL MEDICINE
Payer: MEDICARE

## 2024-05-08 ENCOUNTER — TELEPHONE (OUTPATIENT)
Facility: CLINIC | Age: 69
End: 2024-05-08

## 2024-05-08 ENCOUNTER — ANESTHESIA EVENT (OUTPATIENT)
Dept: ENDOSCOPY | Age: 69
End: 2024-05-08
Payer: MEDICARE

## 2024-05-08 VITALS
RESPIRATION RATE: 19 BRPM | BODY MASS INDEX: 28.35 KG/M2 | HEART RATE: 60 BPM | HEIGHT: 63 IN | OXYGEN SATURATION: 98 % | SYSTOLIC BLOOD PRESSURE: 125 MMHG | DIASTOLIC BLOOD PRESSURE: 67 MMHG | WEIGHT: 160 LBS

## 2024-05-08 DIAGNOSIS — Z12.11 COLON CANCER SCREENING: ICD-10-CM

## 2024-05-08 LAB — GLUCOSE BLDC GLUCOMTR-MCNC: 90 MG/DL (ref 70–99)

## 2024-05-08 PROCEDURE — 82962 GLUCOSE BLOOD TEST: CPT

## 2024-05-08 PROCEDURE — G0121 COLON CA SCRN NOT HI RSK IND: HCPCS | Performed by: INTERNAL MEDICINE

## 2024-05-08 PROCEDURE — 99070 SPECIAL SUPPLIES PHYS/QHP: CPT | Performed by: INTERNAL MEDICINE

## 2024-05-08 RX ORDER — SODIUM CHLORIDE, SODIUM LACTATE, POTASSIUM CHLORIDE, CALCIUM CHLORIDE 600; 310; 30; 20 MG/100ML; MG/100ML; MG/100ML; MG/100ML
INJECTION, SOLUTION INTRAVENOUS CONTINUOUS
Status: DISCONTINUED | OUTPATIENT
Start: 2024-05-08 | End: 2024-05-08

## 2024-05-08 RX ORDER — LIDOCAINE HYDROCHLORIDE 10 MG/ML
INJECTION, SOLUTION EPIDURAL; INFILTRATION; INTRACAUDAL; PERINEURAL AS NEEDED
Status: DISCONTINUED | OUTPATIENT
Start: 2024-05-08 | End: 2024-05-08 | Stop reason: SURG

## 2024-05-08 RX ADMIN — SODIUM CHLORIDE, SODIUM LACTATE, POTASSIUM CHLORIDE, CALCIUM CHLORIDE: 600; 310; 30; 20 INJECTION, SOLUTION INTRAVENOUS at 08:14:00

## 2024-05-08 RX ADMIN — LIDOCAINE HYDROCHLORIDE 50 MG: 10 INJECTION, SOLUTION EPIDURAL; INFILTRATION; INTRACAUDAL; PERINEURAL at 08:16:00

## 2024-05-08 NOTE — ANESTHESIA POSTPROCEDURE EVALUATION
Patient: Philomena Kwon    Procedure Summary       Date: 05/08/24 Room / Location: [facility] ENDOSCOPY 01 / [facility] END[facility]    Anesthesia Start: 0814 Anesthesia Stop: 0846    Procedure: COLONOSCOPY Diagnosis:       Colon cancer screening      (normal colonoscopy)    Surgeons: Romario Sweeney MD Anesthesiologist: Dillan Ordoñez MD    Anesthesia Type: MAC ASA Status: 2            Anesthesia Type: MAC    Vitals Value Taken Time   /47 05/08/24 0844   Temp  05/08/24 0847   Pulse 66 05/08/24 0846   Resp 14 05/08/24 0846   SpO2 98 % 05/08/24 0846   Vitals shown include unfiled device data.    [facility] AN Post Evaluation:   Patient Evaluated in PACU  Patient Participation: complete - patient participated  Level of Consciousness: awake and alert  Pain Management: adequate  Airway Patency:patent  Yes    Nausea/Vomiting: none  Cardiovascular Status: acceptable  Respiratory Status: acceptable and room air  Postoperative Hydration acceptable      Dillan Ordoñez MD  5/8/2024 8:47 AM

## 2024-05-08 NOTE — H&P
History & Physical Examination    Patient Name: Philomena Kwon  MRN: [id number]  CSN: [id number]  YOB: 1955    Diagnosis: Colorectal cancer screening      Medications Prior to Admission   Medication Sig Dispense Refill Last Dose    rosuvastatin 10 MG Oral Tab Take 1 tablet (10 mg total) by mouth nightly. 90 tablet 3 2024    metFORMIN  MG Oral Tablet 24 Hr Take 1 tablet (500 mg total) by mouth daily. 90 tablet 3 2024    Vitamin D3, Cholecalciferol, 50 MCG (2000 UT) Oral Cap Take 1 capsule (2,000 Units total) by mouth daily.  0 2024    Multiple Vitamin Oral Tab Take  by mouth. take 1 tablet by ORAL route  every day with food   2024    triamcinolone 0.5 % External Cream Use bid as directed 30 g 0     Na Sulfate-K Sulfate-Mg Sulf (SUPREP BOWEL PREP KIT) 17.5-3.13-1.6 GM/177ML Oral Solution Take as discussed in clinic 1 each 0      Current Facility-Administered Medications   Medication Dose Route Frequency    lactated ringers infusion   Intravenous Continuous       Allergies: No Known Allergies    Past Medical History:    BRCA gene positive    Cancer of breast, female (HCC)    bilateral masectomy / chemo / XRT    High cholesterol    Hyperlipidemia    Prediabetes     Past Surgical History:   Procedure Laterality Date    Colonoscopy  2014    Mastectomy left  2013    Mastectomy right            Removal of ovary(s) Bilateral     prophylatic     Family History   Problem Relation Age of Onset    Breast Cancer Mother 65    Cancer Mother     Breast Cancer Sister 48    Diabetes Sister     Breast Cancer Maternal Aunt 59     Social History     Tobacco Use    Smoking status: Never     Passive exposure: Never    Smokeless tobacco: Never   Substance Use Topics    Alcohol use: Yes     Alcohol/week: 1.0 standard drink of alcohol     Types: 1 Glasses of wine per week     Comment: occ       SYSTEM Check if Review is Normal Check if Physical Exam is Normal If not normal, please explain:    HEENT [X ] [ X]    NECK  [X ] [ X]    HEART [X ] [ X]    LUNGS [X ] [ X]    ABDOMEN [X ] [ X]    EXTREMITIES [X ] [ X]    OTHER        [ x ] I have discussed the risks and benefits and alternatives with the patient/family.  They understand and agree to proceed with plan of care.  [ x ] I have reviewed the History and Physical done within the last 30 days.  Any changes noted above.    Romario Sweeney MD  5/8/2024  8:13 AM

## 2024-05-08 NOTE — TELEPHONE ENCOUNTER
GI RNs: Please enter in health maintenance that a colonoscopy was performed and enter colonoscopy recall for 10 years.

## 2024-05-08 NOTE — TELEPHONE ENCOUNTER
Health maintenance updated.     Last colonoscopy done 5/8/2024 by Dr Sweeney     Recall placed into Pt Outreach, next due on 5/2034 per Dr Sweeney.

## 2024-05-08 NOTE — ANESTHESIA PREPROCEDURE EVALUATION
Anesthesia PreOp Note    HPI:     Philomena Kwon is a 68 year old female who presents for preoperative consultation requested by: Romario Sweeney MD    Date of Surgery: 2024    Procedure(s):  COLONOSCOPY  Indication: Colon cancer screening    Relevant Problems   No relevant active problems       NPO:  Last Liquid Consumption Date: 24  Last Liquid Consumption Time: 0300  Last Solid Consumption Date: 24  Last Solid Consumption Time: 1700  Last Liquid Consumption Date: 24          History Review:  Patient Active Problem List    Diagnosis Date Noted    History of breast cancer 2014       Past Medical History:    BRCA gene positive    Cancer of breast, female (HCC)    bilateral masectomy / chemo / XRT    High cholesterol    Hyperlipidemia    Prediabetes       Past Surgical History:   Procedure Laterality Date    Colonoscopy  2014    Mastectomy left      Mastectomy right            Removal of ovary(s) Bilateral     prophylatic       Medications Prior to Admission   Medication Sig Dispense Refill Last Dose    rosuvastatin 10 MG Oral Tab Take 1 tablet (10 mg total) by mouth nightly. 90 tablet 3 2024    metFORMIN  MG Oral Tablet 24 Hr Take 1 tablet (500 mg total) by mouth daily. 90 tablet 3 2024    Vitamin D3, Cholecalciferol, 50 MCG (2000 UT) Oral Cap Take 1 capsule (2,000 Units total) by mouth daily.  0 2024    Multiple Vitamin Oral Tab Take  by mouth. take 1 tablet by ORAL route  every day with food   2024    triamcinolone 0.5 % External Cream Use bid as directed 30 g 0     Na Sulfate-K Sulfate-Mg Sulf (SUPREP BOWEL PREP KIT) 17.5-3.13-1.6 GM/177ML Oral Solution Take as discussed in clinic 1 each 0      Current Facility-Administered Medications Ordered in Epic   Medication Dose Route Frequency Provider Last Rate Last Admin    lactated ringers infusion   Intravenous Continuous Romario Sweeney MD 20 mL/hr at 24 0740 New Bag at 24  0740     No current [facility]-ordered outpatient medications on file.       No Known Allergies    Family History   Problem Relation Age of Onset    Breast Cancer Mother 65    Cancer Mother     Breast Cancer Sister 48    Diabetes Sister     Breast Cancer Maternal Aunt 59     Social History     Socioeconomic History    Marital status:    Tobacco Use    Smoking status: Never     Passive exposure: Never    Smokeless tobacco: Never   Vaping Use    Vaping status: Never Used   Substance and Sexual Activity    Alcohol use: Yes     Alcohol/week: 1.0 standard drink of alcohol     Types: 1 Glasses of wine per week     Comment: occ    Drug use: No    Sexual activity: Not Currently   Other Topics Concern    Caffeine Concern No    Grew up on a farm No    History of tanning No    Outdoor occupation No    Breast feeding No    Reaction to local anesthetic No    Pt has a pacemaker No    Pt has a defibrillator No       Available pre-op labs reviewed.             Vital Signs:  Body mass index is 28.34 kg/m².   height is 1.6 m (5' 3\") and weight is 72.6 kg (160 lb). Her blood pressure is 151/74 and her pulse is 72. Her respiration is 19 and oxygen saturation is 99%.   Vitals:    05/02/24 1449 05/08/24 0726   BP:  151/74   Pulse:  72   Resp:  19   SpO2:  99%   Weight: 72.6 kg (160 lb) 72.6 kg (160 lb)   Height: 1.6 m (5' 3\") 1.6 m (5' 3\")        Anesthesia Evaluation     Patient summary reviewed and Nursing notes reviewed    No history of anesthetic complications   Airway   Mallampati: II  TM distance: >3 FB  Neck ROM: full  Dental - Dentition appears grossly intact     Pulmonary - negative ROS and normal exam   Cardiovascular - negative ROS and normal exam  Exercise tolerance: good    Neuro/Psych - negative ROS     GI/Hepatic/Renal    (+) bowel prep    Endo/Other    (+) diabetes mellitus  Abdominal                  Anesthesia Plan:   ASA:  2  Plan:   MAC  Plan Comments: I have discussed the anesthetic plan, major risks and  alternatives with the patient and answered all questions. The patient desires to proceed with surgery and anesthesia as planned.     Informed Consent Plan and Risks Discussed With:  Patient      I have informed Philomena Kwon and/or legal guardian or family member of the nature of the anesthetic plan, benefits, risks including possible dental damage if relevant, major complications, and any alternative forms of anesthetic management.   All of the patient's questions were answered to the best of my ability. The patient desires the anesthetic management as planned.  Dillan Ordoñez MD  5/8/2024 7:47 AM  Present on Admission:  **None**

## 2024-05-08 NOTE — DISCHARGE INSTRUCTIONS
Home Care Instructions for Colonoscopy  Diet:  - Resume your regular diet as tolerated unless otherwise instructed.  - Start with light meals to minimize bloating.  - Do not drink alcohol today.    Medication:  - If you have questions about resuming your normal medications, please contact your Primary Care Physician.    Activities:  - Take it easy today. Do not return to work today.  - Do not drive today.  - Do not operate any machinery today (including kitchen equipment).    Colonoscopy:  - You may notice some rectal \"spotting\" (a little blood on the toilet tissue) for a day or two after the exam. This is normal.  - If you experience any rectal bleeding (not spotting), persistent tenderness or sharp severe abdominal pains, oral temperature over 100 degrees Fahrenheit, light-headedness or dizziness, or any other problems, contact your doctor.      **If unable to reach your doctor, please go to the [facility] Emergency Room**    - Your referring physician will receive a full report of your examination.  - If you do not hear from your doctor's office within two weeks of your biopsy, please call them for your results.    You may be able to see your laboratory results in [employer] between 4 and 7 business days.  In some cases, your physician may not have viewed the results before they are released to [employer].  If you have questions regarding your results contact the physician who ordered the test/exam by phone or via [employer] by choosing \"Ask a Medical Question.\"

## 2024-05-08 NOTE — OPERATIVE REPORT
[facility] Endoscopy Report      Date of Procedure:  05/08/24      Preoperative Diagnosis:  Colorectal cancer screening      Postoperative Diagnosis:  Normal colonoscopy      Procedure:    Screening colonoscopy      Surgeon:  Romario Sweeney M.D.      Anesthesia:  Monitored anesthesia care  Cecal withdrawal time: 15 minutes  EBL: None      Brief History:  This is a 68 year old female who presents for a screening colonoscopy.  Her last examination is approaching 10 years prior.  The patient has had no lower gastrointestinal tract symptoms, signs or family history of colorectal cancer.      Technique:  After informed consent, the patient was placed in the left lateral recumbent position.  Digital rectal examination revealed no palpable intraluminal abnormalities.  An Olympus variable stiffness 190 series HD colonoscope was inserted into the rectum and advanced under direct vision by following the lumen to the terminal ileum.  The colon was examined upon withdrawal in the left lateral recumbent position.      Findings:  The preparation of the colon was excellent.  The terminal ileum was examined for 5 cm and visually normal.  The ileocecal valve was well preserved. The visualized colonic mucosa from the cecum to the anal verge was normal with an intact vascular pattern. There were no colonic polyps, definite diverticula, mass lesions, vascular anomalies or signs of inflammation seen.  Retroflexion in the rectum revealed no abnormalities.  The procedure was well tolerated without immediate complication.      Impression:  Normal screening colonoscopy to the terminal ileum    Recommendations:  In the absence of any new symptoms or signs repeat screening colonoscopy in 10 years.        Romario Sweeney MD  5/8/2024

## 2024-06-28 DIAGNOSIS — R73.03 PREDIABETES: ICD-10-CM

## 2024-07-02 RX ORDER — METFORMIN HYDROCHLORIDE 500 MG/1
500 TABLET, EXTENDED RELEASE ORAL DAILY
Qty: 90 TABLET | Refills: 3 | Status: SHIPPED | OUTPATIENT
Start: 2024-07-02

## 2024-07-02 NOTE — TELEPHONE ENCOUNTER
Please review. Rx failed/no protocol.    Message sent to patient about lab work needing to be done from 1/19/24    Requested Prescriptions   Pending Prescriptions Disp Refills    METFORMIN  MG Oral Tablet 24 Hr [Pharmacy Med Name: METFORMIN HCL  MG TABLET] 90 tablet 3     Sig: Take 1 tablet (500 mg total) by mouth daily.       Diabetes Medication Protocol Failed - 6/28/2024 12:05 AM        Failed - Last A1C < 7.5 and within past 6 months     Lab Results   Component Value Date    A1C 6.1 (H) 11/21/2023             Failed - Microalbumin procedure in past 12 months or taking ACE/ARB        Passed - In person appointment or virtual visit in the past 6 mos or appointment in next 3 mos     Recent Outpatient Visits              5 months ago Colon cancer screening    [facility]    Nurse Only    5 months ago Medicare annual wellness visit, subsequent    [facility]    [facility]    8 months ago AK (actinic keratosis)    [facility]    Office Visit    8 months ago Encounter for well woman exam with routine gynecological exam    [facility] Ernest[facility]    Office Visit    1 year ago Prediabetes    [facility]    [facility]          Future Appointments         Provider Department Appt Notes    In 4 months [facility]                    Passed - EGFRCR or GFRNAA > 50     GFR Evaluation  EGFRCR: 94 , resulted on 11/21/2023          Passed - GFR in the past 12 months             Future Appointments         Provider Department Appt Notes    In 4 months [facility]          Recent  Outpatient Visits              5 months ago [facility]    5 months ago Medicare annual wellness visit, subsequent    [facility]    Office Visit    8 months ago AK (actinic keratosis)    [facility]    Office Visit    8 months ago Encounter for well woman exam with routine gynecological exam    [facility]    Office Visit    1 year ago Prediabetes    [facility]    Office Visit

## 2024-07-18 ENCOUNTER — OFFICE VISIT (OUTPATIENT)
Dept: DERMATOLOGY CLINIC | Facility: CLINIC | Age: 69
End: 2024-07-18
Payer: MEDICARE

## 2024-07-18 DIAGNOSIS — L30.9 DERMATITIS: Primary | ICD-10-CM

## 2024-07-18 PROCEDURE — 99213 OFFICE O/P EST LOW 20 MIN: CPT | Performed by: PHYSICIAN ASSISTANT

## 2024-07-18 RX ORDER — MOMETASONE FUROATE 1 MG/G
1 OINTMENT TOPICAL DAILY
Qty: 15 G | Refills: 0 | Status: SHIPPED | OUTPATIENT
Start: 2024-07-18

## 2024-07-18 NOTE — PROGRESS NOTES
HPI:    Patient ID: Philomena Kwon is a 69 year old female.    Patient presents with red lesion to right cheek, \"Dr. Magaña said it was eczema\" uses TAC 0.5% BID but stopped since she didn't see progress. Onset April 2024. Sometimes lesion stings. No hx of skin cancer. No draining or tenderness noted. No allergies to medications noted.         Review of Systems   Constitutional:  Negative for chills and fever.   Musculoskeletal:  Negative for arthralgias and myalgias.   Skin:  Positive for rash. Negative for color change and wound.            Current Outpatient Medications   Medication Sig Dispense Refill    mometasone 0.1 % External Ointment Apply 1 Application topically daily. 15 g 0    metFORMIN  MG Oral Tablet 24 Hr Take 1 tablet (500 mg total) by mouth daily. 90 tablet 3    rosuvastatin 10 MG Oral Tab Take 1 tablet (10 mg total) by mouth nightly. 90 tablet 3    triamcinolone 0.5 % External Cream Use bid as directed 30 g 0    Vitamin D3, Cholecalciferol, 50 MCG (2000 UT) Oral Cap Take 1 capsule (2,000 Units total) by mouth daily.  0    Multiple Vitamin Oral Tab Take  by mouth. take 1 tablet by ORAL route  every day with food       Allergies:No Known Allergies   There were no vitals taken for this visit.  There is no height or weight on file to calculate BMI.  PHYSICAL EXAM:   Physical Exam  Constitutional:       General: She is not in acute distress.     Appearance: Normal appearance.   Skin:     General: Skin is warm and dry.      Findings: Rash present.      Comments: Eczematous areas noted on the right cheek. Erythema and slight scaling. No draining or tenderness noted.    Neurological:      Mental Status: She is alert and oriented to person, place, and time.                ASSESSMENT/PLAN:   1. Dermatitis  -After discussion with patient, advised the following:  -Start mometasone  -Educated to apply 2 times per day   -Told to return in 2 weeks if not improving  -Will consider cryotherapy and see if it  resolve with this and if it is an Ak instead.   -To call or follow-up with worsening symptoms or concerns.   -Pt was agreeable to plan and will comply with discussion above.         No orders of the defined types were placed in this encounter.      Meds This Visit:  Requested Prescriptions     Signed Prescriptions Disp Refills    mometasone 0.1 % External Ointment 15 g 0     Sig: Apply 1 Application topically daily.       Imaging & Referrals:  None         ID#[id number]

## 2024-08-27 ENCOUNTER — OFFICE VISIT (OUTPATIENT)
Dept: DERMATOLOGY CLINIC | Facility: CLINIC | Age: 69
End: 2024-08-27
Payer: MEDICARE

## 2024-08-27 DIAGNOSIS — L30.9 DERMATITIS: Primary | ICD-10-CM

## 2024-08-27 DIAGNOSIS — L57.0 ACTINIC KERATOSIS: ICD-10-CM

## 2024-08-27 PROCEDURE — 99213 OFFICE O/P EST LOW 20 MIN: CPT | Performed by: PHYSICIAN ASSISTANT

## 2024-08-27 PROCEDURE — 17000 DESTRUCT PREMALG LESION: CPT | Performed by: PHYSICIAN ASSISTANT

## 2024-08-27 RX ORDER — KETOCONAZOLE 20 MG/G
CREAM TOPICAL
Qty: 30 G | Refills: 1 | Status: SHIPPED | OUTPATIENT
Start: 2024-08-27

## 2024-08-27 NOTE — PROGRESS NOTES
HPI:    Patient ID: Philomena Kwon is a 69 year old female.    Patient presents for dermatitis follow-up. Used mometasone for 2 weeks with some improvement. No draining or tenderness noted. No allergies to medications noted. Patient also has a rash under her right arm. No draining or tenderness noted. Slight scaling noted. Has had it before.         Review of Systems   Constitutional:  Negative for chills and fever.   Musculoskeletal:  Negative for arthralgias and myalgias.   Skin:  Positive for rash. Negative for color change and wound.            Current Outpatient Medications   Medication Sig Dispense Refill    mometasone 0.1 % External Ointment Apply 1 Application topically daily. 15 g 0    metFORMIN  MG Oral Tablet 24 Hr Take 1 tablet (500 mg total) by mouth daily. 90 tablet 3    rosuvastatin 10 MG Oral Tab Take 1 tablet (10 mg total) by mouth nightly. 90 tablet 3    triamcinolone 0.5 % External Cream Use bid as directed 30 g 0    Vitamin D3, Cholecalciferol, 50 MCG (2000 UT) Oral Cap Take 1 capsule (2,000 Units total) by mouth daily.  0    Multiple Vitamin Oral Tab Take  by mouth. take 1 tablet by ORAL route  every day with food       Allergies:No Known Allergies   There were no vitals taken for this visit.  There is no height or weight on file to calculate BMI.  PHYSICAL EXAM:   Physical Exam  Constitutional:       General: She is not in acute distress.     Appearance: Normal appearance.   Skin:     General: Skin is warm and dry.      Findings: Rash present.      Comments: Ak noted on the right cheek. No draining or tenderness noted. Slight scaling noted. Slight erythema noted.     Erythematous scaling rash noted on the left underarm. No draining or tenderness noted.    Neurological:      Mental Status: She is alert and oriented to person, place, and time.                ASSESSMENT/PLAN:   1. Dermatitis  -After discussion with patient, advised the following:  -Started ketoconazole   -Educated to apply  2 times per day   -May take 1 month to resolve.   -To call or follow-up with worsening symptoms or concerns.   -Pt was agreeable to plan and will comply with discussion above.       2. Actinic keratosis  -After discussion with patient, advised the following:  - Cryosurgery of non-malignant lesion(s)  - Risks, benefits, alternatives and personnel required for cryosurgery reviewed with patient. Discussed the expected redness, as well as the risks of swelling, blistering, crusting, pigmentary changes, and permanent scarring. . Discussed that lesion may need additional treatments in future or may recur. Pt verbalizes understanding and wishes to proceed.   - Cryosurgery performed with Liquid Nitrogen via cryostat spray gun to Actinic Keratosis. 1 lesion(s) treated.   - Patient tolerated well and wound care discussed.   -Pt was agreeable to plan and will comply with discussion above.         No orders of the defined types were placed in this encounter.      Meds This Visit:  Requested Prescriptions      No prescriptions requested or ordered in this encounter       Imaging & Referrals:  None         ID#[id number]

## 2024-09-27 ENCOUNTER — OFFICE VISIT (OUTPATIENT)
Dept: DERMATOLOGY CLINIC | Facility: CLINIC | Age: 69
End: 2024-09-27
Payer: MEDICARE

## 2024-09-27 DIAGNOSIS — L30.9 DERMATITIS: Primary | ICD-10-CM

## 2024-09-27 PROCEDURE — 99213 OFFICE O/P EST LOW 20 MIN: CPT | Performed by: PHYSICIAN ASSISTANT

## 2024-11-04 ENCOUNTER — OFFICE VISIT (OUTPATIENT)
Dept: DERMATOLOGY CLINIC | Facility: CLINIC | Age: 69
End: 2024-11-04
Payer: MEDICARE

## 2024-11-04 DIAGNOSIS — L82.0 INFLAMED SEBORRHEIC KERATOSIS: ICD-10-CM

## 2024-11-04 DIAGNOSIS — L82.1 SEBORRHEIC KERATOSES: ICD-10-CM

## 2024-11-04 DIAGNOSIS — D22.9 MULTIPLE NEVI: ICD-10-CM

## 2024-11-04 DIAGNOSIS — L30.9 DERMATITIS: ICD-10-CM

## 2024-11-04 DIAGNOSIS — L81.4 LENTIGO: ICD-10-CM

## 2024-11-04 DIAGNOSIS — D23.9 BENIGN NEOPLASM OF SKIN, UNSPECIFIED LOCATION: ICD-10-CM

## 2024-11-04 DIAGNOSIS — L57.0 AK (ACTINIC KERATOSIS): Primary | ICD-10-CM

## 2024-11-04 PROCEDURE — 99213 OFFICE O/P EST LOW 20 MIN: CPT | Performed by: DERMATOLOGY

## 2024-11-04 PROCEDURE — 17000 DESTRUCT PREMALG LESION: CPT | Performed by: DERMATOLOGY

## 2024-11-04 RX ORDER — KETOCONAZOLE 20 MG/G
CREAM TOPICAL
Qty: 30 G | Refills: 1 | Status: SHIPPED | OUTPATIENT
Start: 2024-11-04

## 2024-11-17 NOTE — PROGRESS NOTES
Philomena Kwon is a 69 year old female.  HPI:     CC:    Chief Complaint   Patient presents with    Full Skin Exam     LOV 2024 NK PT present for a FBSE. Pt would like areas of Dermatitis on face and bra line treat by NK to be checked. Pt has a hx of Aks and Dermatitis. No hx of skin cancer or skin disease, no family hx.         Allergies:  Patient has no known allergies.    HISTORY:    Past Medical History:    BRCA gene positive    Cancer of breast, female (HCC)    bilateral masectomy / chemo / XRT    High cholesterol    Hyperlipidemia    Prediabetes      Past Surgical History:   Procedure Laterality Date    Colonoscopy  2014    Colonoscopy N/A 2024    Procedure: COLONOSCOPY;  Surgeon: Romario Sweeney MD;  Location: [facility] ENDO    Mastectomy left      Mastectomy right            Removal of ovary(s) Bilateral     prophylatic      Family History   Problem Relation Age of Onset    Breast Cancer Mother 65    Cancer Mother     Breast Cancer Sister 48    Diabetes Sister     Breast Cancer Maternal Aunt 59      Social History     Socioeconomic History    Marital status:    Tobacco Use    Smoking status: Never     Passive exposure: Never    Smokeless tobacco: Never   Vaping Use    Vaping status: Never Used   Substance and Sexual Activity    Alcohol use: Yes     Alcohol/week: 1.0 standard drink of alcohol     Types: 1 Glasses of wine per week     Comment: occ    Drug use: No    Sexual activity: Not Currently   Other Topics Concern    Caffeine Concern No    Grew up on a farm No    History of tanning No    Outdoor occupation No    Breast feeding No    Reaction to local anesthetic No    Pt has a pacemaker No    Pt has a defibrillator No     Social Drivers of Health      Received from [facility], [facility]    Social Connections    Received from [facility], [facility]    Housing Stability        Current  Outpatient Medications   Medication Sig Dispense Refill    ketoconazole 2 % External Cream Apply to affected area 2 times daily. 30 g 1    metFORMIN  MG Oral Tablet 24 Hr Take 1 tablet (500 mg total) by mouth daily. 90 tablet 3    rosuvastatin 10 MG Oral Tab Take 1 tablet (10 mg total) by mouth nightly. 90 tablet 3    Vitamin D3, Cholecalciferol, 50 MCG (2000 UT) Oral Cap Take 1 capsule (2,000 Units total) by mouth daily.  0    Multiple Vitamin Oral Tab Take  by mouth. take 1 tablet by ORAL route  every day with food      mometasone 0.1 % External Ointment Apply 1 Application topically daily. (Patient not taking: Reported on 2024) 15 g 0    triamcinolone 0.5 % External Cream Use bid as directed (Patient not taking: Reported on 2024) 30 g 0     Allergies:   No Known Allergies    Past Medical History:    BRCA gene positive    Cancer of breast, female (HCC)    bilateral masectomy / chemo / XRT    High cholesterol    Hyperlipidemia    Prediabetes     Past Surgical History:   Procedure Laterality Date    Colonoscopy  2014    Colonoscopy N/A 2024    Procedure: COLONOSCOPY;  Surgeon: Romario Sweeney MD;  Location: [facility]    Mastectomy left      Mastectomy right            Removal of ovary(s) Bilateral     prophylatic     Social History     Socioeconomic History    Marital status:      Spouse name: Not on file    Number of children: Not on file    Years of education: Not on file    Highest education level: Not on file   Occupational History    Not on file   Tobacco Use    Smoking status: Never     Passive exposure: Never    Smokeless tobacco: Never   Vaping Use    Vaping status: Never Used   Substance and Sexual Activity    Alcohol use: Yes     Alcohol/week: 1.0 standard drink of alcohol     Types: 1 Glasses of wine per week     Comment: occ    Drug use: No    Sexual activity: Not Currently   Other Topics Concern     Service Not Asked    Blood Transfusions Not  Asked    Caffeine Concern No    Occupational Exposure Not Asked    Hobby Hazards Not Asked    Sleep Concern Not Asked    Stress Concern Not Asked    Weight Concern Not Asked    Special Diet Not Asked    Back Care Not Asked    Exercise Not Asked    Bike Helmet Not Asked    Seat Belt Not Asked    Self-Exams Not Asked    Grew up on a farm No    History of tanning No    Outdoor occupation No    Breast feeding No    Reaction to local anesthetic No    Pt has a pacemaker No    Pt has a defibrillator No   Social History Narrative    Not on file     Social Drivers of Health     Financial Resource Strain: Not on file   Food Insecurity: Not on file   Transportation Needs: Not on file   Physical Activity: Not on file   Stress: Not on file   Social Connections: Unknown (3/13/2021)    [facility]    Social Connections     Conversations with friends/family/neighbors per week: Not on file   Housing Stability: Low Risk  (7/17/2021)    [facility], [facility]    Housing Stability     Mortgage Payment Concerns?: Not on file     Number of Places Lived in the Last Year: Not on file     Unstable Housing?: Not on file     Family History   Problem Relation Age of Onset    Breast Cancer Mother 65    Cancer Mother     Breast Cancer Sister 48    Diabetes Sister     Breast Cancer Maternal Aunt 59       There were no vitals filed for this visit.    HPI:    Chief Complaint   Patient presents with    Full Skin Exam     LOV 09/27/2024 NK PT present for a FBSE. Pt would like areas of Dermatitis on face and bra line treat by NK to be checked. Pt has a hx of Aks and Dermatitis. No hx of skin cancer or skin disease, no family hx.       New patient no personal family history of skin cancer.  Has noted multiple skin lesions scaling.  History of breast cancer postmastectomy    Follow-up history of AK's    Does use sunscreen  Lots of sun  in the past.     Patient presents with concerns above.    Patient has been in their usual state of health.      History, medications, allergies reviewed as noted.      ROS:  new relevant systemic complaints as noted       Physical Examination:     Well-developed well-nourished patient alert oriented in no acute distress.  Exam total-body performed, including scalp, head, neck, face,nails, hair, external eyes, including conjunctival mucosa, eyelids, lips external ears, back, chest,/ breasts, axillae,  abdomen, arms, legs, palms.     Multiple light to medium brown, well marginated, uniformly pigmented, macules and papules 6 mm and less scattered on exam. pigmented lesions examined with dermoscopy benign-appearing patterns.     Waxy tannish keratotic papules scattered, cherry-red vascular papules scattered.    See map today's date for lesions noted .      Otherwise remarkable for lesions as noted on map.  See details of examination  See Assessment /Plan for additional history and physical exam also:    Assessment / plan:    No orders of the defined types were placed in this encounter.      Meds & Refills for this Visit:  Requested Prescriptions     Signed Prescriptions Disp Refills    ketoconazole 2 % External Cream 30 g 1     Sig: Apply to affected area 2 times daily.         Encounter Diagnoses   Name Primary?    AK (actinic keratosis) Yes    Seborrheic keratoses     Benign neoplasm of skin, unspecified location     Multiple nevi     Lentigo     Dermatitis     Inflamed seborrheic keratosis        See details on map.      Remarkable for:    Patient with history of skin cancer.  No evidence of recurrence.    No new skin cancer.    Erythematous scaling keratotic papules noted at sites noted on map  Actinic Keratoses.  Precancerous nature discussed. Sun protection, sunscreen/ blocks encouraged Lesions treated with cryo- .  Biopsy if not resolved.    Right lower cheek    Dermatitis.  Meds in grid.  Skin care instructions  reviewed.  Purmela use of emollients.  Pathophysiology reviewed.  Consider Contac allergy in differential.  Consider patch testing.  Patient will let us know how they are doing over the next several weeks.  Await clinical response to above therapies.  Dorsal hands, hydrocortisone, prescriptions steroid if worsening stable continue moisturizers monitoring    Intertrigo continue ketoconazole    Actinic damage with poikiloderma over the central chest splotchy erythema dyschromia observe carefully.    Inflamed keratosis right lateral bra line reassurance    Multiple lentigines over the brows observe carefully.  Scattered nevi generalized observe.    Verruca a over the hands observe consider trial of cryo.    Medium brown macule 5 mm at right lateral thigh benign-appearing nevus on dermoscopy observation    Benign-appearing nevi, no atypical features on dermoscopy reassurance given monitor.    Waxy tan keratotic papules lesions in areas of concern as noted reassurance given.  Benign nature discussed.  Possibility of cryo, alphahydroxy acids over-the-counter retinol's discussed.    No other susupicious lesions on todays  exam.    Please refer to map for specific lesions.  See additional diagnoses.  Pros cons of various therapies, risks benefits discussed.Pathophysiology discussed with patient.  Therapeutic options reviewed.  See  Medications in grid.  Instructions reviewed at length.    Benign nevi, seborrheic  keratoses, cherry angiomas:  Reassurance regarding other benign skin lesions.Signs and symptoms of skin cancer, ABCDE's of melanoma discussed with patient. Sunscreen use, sun protection, self exams reviewed.  Followup as noted RTC routine checkup 6 mos - one year or p.r.n.    Encounter Times Including precharting, reviewing chart, prior notes obtaining history: 10 minutes, medical exam :10 minutes, notes on body map, plan, counseling 10minutes My total time spent caring for the patient on the day of the encounter:  30 minutes     The patient indicates understanding of these issues and agrees to the plan.  The patient is asked to return as noted in follow-up/ above.    This note was generated using Dragon voice recognition software.  Please contact me regarding any confusion resulting from errors in recognition.

## 2025-01-07 ENCOUNTER — OFFICE VISIT (OUTPATIENT)
Dept: INTERNAL MEDICINE CLINIC | Facility: CLINIC | Age: 70
End: 2025-01-07
Payer: MEDICARE

## 2025-01-07 VITALS
HEART RATE: 82 BPM | BODY MASS INDEX: 30.3 KG/M2 | DIASTOLIC BLOOD PRESSURE: 82 MMHG | SYSTOLIC BLOOD PRESSURE: 136 MMHG | HEIGHT: 63 IN | WEIGHT: 171 LBS

## 2025-01-07 DIAGNOSIS — Z00.00 MEDICARE ANNUAL WELLNESS VISIT, SUBSEQUENT: Primary | ICD-10-CM

## 2025-01-07 DIAGNOSIS — E66.811 CLASS 1 OBESITY DUE TO EXCESS CALORIES WITHOUT SERIOUS COMORBIDITY WITH BODY MASS INDEX (BMI) OF 30.0 TO 30.9 IN ADULT: ICD-10-CM

## 2025-01-07 DIAGNOSIS — E66.09 CLASS 1 OBESITY DUE TO EXCESS CALORIES WITHOUT SERIOUS COMORBIDITY WITH BODY MASS INDEX (BMI) OF 30.0 TO 30.9 IN ADULT: ICD-10-CM

## 2025-01-07 DIAGNOSIS — E55.9 VITAMIN D DEFICIENCY: ICD-10-CM

## 2025-01-07 DIAGNOSIS — R73.03 PREDIABETES: ICD-10-CM

## 2025-01-07 DIAGNOSIS — Z90.13 STATUS POST BILATERAL MASTECTOMY: ICD-10-CM

## 2025-01-07 DIAGNOSIS — E78.5 HYPERLIPIDEMIA, UNSPECIFIED HYPERLIPIDEMIA TYPE: ICD-10-CM

## 2025-01-07 DIAGNOSIS — Z85.3 HISTORY OF BREAST CANCER: ICD-10-CM

## 2025-01-07 NOTE — PROGRESS NOTES
Subjective:   Philomena Kwon is a 69 year old female who presents for a Medicare Subsequent Annual Wellness visit (Pt already had Initial Annual Wellness) and scheduled follow up of multiple significant but stable problems.   Patient is generally healthy  History of bilateral mastectomy from breast cancer she has had no recurrence  It has been more than 10 years and she is still followed by oncology at [facility]    Hyperlipidemia/ prediabetes    Headache no  dizziness        no                             Blurred vision no  palpitaionsSyncope no  Chest pain  no  PND  Orthopnea no  Weakness  No   Healthy diet   yes most of the time      Compliance with exercise stays active  Compliance with medication yes                                            History/Other:   Fall Risk Assessment:   She has been screened for Falls and is low risk.      Cognitive Assessment:   She had a completely normal cognitive assessment - see flowsheet entries       Functional Ability/Status:   Philomena Kwon has a completely normal functional assessment. See flowsheet for details.        Depression Screening (PHQ):  PHQ-2 SCORE: 0  , done 1/7/2025            Advanced Directives:   She does NOT have a Living Will. [Do you have a living will?: (Patient-Rptd) No]  She does NOT have a Power of  for Health Care. [Do you have a healthcare power of ?: (Patient-Rptd) No]  Patient has Advance Care Planning documents but we do not have a copy in EMR. Discussed Advanced Care Planning with patient and instructed patient to get our office a copy to be scanned into EMR.      Patient Active Problem List   Diagnosis    History of breast cancer     Allergies:  She has No Known Allergies.    Current Medications:  Outpatient Medications Marked as Taking for the 1/7/25 encounter (Office Visit) with Barbara Piek MD   Medication Sig    metFORMIN  MG Oral Tablet 24 Hr Take 1 tablet (500 mg total) by mouth daily.    rosuvastatin  10 MG Oral Tab Take 1 tablet (10 mg total) by mouth nightly.    Vitamin D3, Cholecalciferol, 50 MCG (2000 UT) Oral Cap Take 1 capsule (2,000 Units total) by mouth daily.    Multiple Vitamin Oral Tab Take  by mouth. take 1 tablet by ORAL route  every day with food       Medical History:  She  has a past medical history of BRCA gene positive, Cancer of breast, female (HCC) (2013), High cholesterol, Hyperlipidemia (2019), and Prediabetes.  Surgical History:  She  has a past surgical history that includes removal of ovary(s) (Bilateral, ); mastectomy left (); mastectomy right (); colonoscopy (2014); ; and colonoscopy (N/A, 2024).   Family History:  Her family history includes Breast Cancer (age of onset: 48) in her sister; Breast Cancer (age of onset: 59) in her maternal aunt; Breast Cancer (age of onset: 65) in her mother; Cancer in her mother; Diabetes in her sister.  Social History:  She  reports that she has never smoked. She has never been exposed to tobacco smoke. She has never used smokeless tobacco. She reports current alcohol use of about 1.0 standard drink of alcohol per week. She reports that she does not use drugs.    Tobacco:       CAGE Alcohol Screen:   CAGE screening score of 0 on 2025, showing low risk of alcohol abuse.      Patient Care Team:  Barbara Pike MD as PCP - General (Internal Medicine)  Parks, Cuong Corey MD (Radiation Oncology)  Ernestina Baires MD (OBSTETRICS & GYNECOLOGY)    Review of Systems   Constitutional:  Negative for activity change, chills, fatigue and fever.   HENT:  Negative for congestion, ear discharge, nosebleeds, postnasal drip, rhinorrhea, sinus pressure and sore throat.    Eyes:  Negative for pain, discharge and redness.   Respiratory:  Negative for cough, chest tightness, shortness of breath and wheezing.    Cardiovascular:  Negative for chest pain, palpitations and leg swelling.   Gastrointestinal:  Negative for abdominal pain, blood in  stool, constipation, diarrhea, nausea and vomiting.   Genitourinary:  Negative for difficulty urinating, dysuria, frequency, hematuria and urgency.   Musculoskeletal:  Negative for back pain, gait problem and joint swelling.   Skin:  Negative for rash and wound.   Neurological:  Negative for dizziness, syncope, weakness, light-headedness and headaches.   Psychiatric/Behavioral:  Negative for dysphoric mood. The patient is not nervous/anxious.          Objective:   Physical Exam  Constitutional:       General: She is not in acute distress.     Appearance: She is well-developed. She is not diaphoretic.   HENT:      Head: Normocephalic and atraumatic.      Right Ear: Ear canal normal.      Left Ear: Ear canal normal.      Nose: Nose normal.      Mouth/Throat:      Pharynx: No oropharyngeal exudate or posterior oropharyngeal erythema.   Eyes:      General: No scleral icterus.        Right eye: No discharge.         Left eye: No discharge.      Conjunctiva/sclera: Conjunctivae normal.      Pupils: Pupils are equal, round, and reactive to light.   Cardiovascular:      Rate and Rhythm: Normal rate and regular rhythm.      Heart sounds: Normal heart sounds. No murmur heard.  Pulmonary:      Effort: Pulmonary effort is normal. No respiratory distress.      Breath sounds: Normal breath sounds. No wheezing.   Abdominal:      General: Bowel sounds are normal.      Palpations: Abdomen is soft. There is no mass.      Tenderness: There is no abdominal tenderness. There is no guarding or rebound.      Hernia: No hernia is present.   Musculoskeletal:         General: No tenderness.   Skin:     General: Skin is warm and dry.      Findings: No lesion or rash.   Neurological:      Mental Status: She is alert.      Gait: Gait normal.   Psychiatric:         Behavior: Behavior normal.         Thought Content: Thought content normal.     Bilateral mastectomy scars noted      /82 (BP Location: Right arm, Patient Position: Sitting,  Cuff Size: large)   Pulse 82   Ht 5' 3\" (1.6 m)   Wt 171 lb (77.6 kg)   BMI 30.29 kg/m²  Estimated body mass index is 30.29 kg/m² as calculated from the following:    Height as of this encounter: 5' 3\" (1.6 m).    Weight as of this encounter: 171 lb (77.6 kg).    Medicare Hearing Assessment:   Hearing Screening    Screening Method: Questionnaire  I have a problem hearing over the telephone: No I have trouble following the conversations when two or more people are talking at the same time: No   I have trouble understanding things on the TV: No I have to strain to understand conversations: No   I have to worry about missing the telephone ring or doorbell: No I have trouble hearing conversations in a noisy background such as a crowded room or restaurant: No   I get confused about where sounds come from: No I misunderstand some words in a sentence and need to ask people to repeat themselves: No   I especially have trouble understanding the speech of women and children: No I have trouble understanding the speaker in a large room such as at a meeting or place of [place]: No   Many people I talk to seem to mumble (or don't speak clearly): No People get annoyed because I misunderstand what they say: No   I misunderstand what others are saying and make inappropriate responses: No I avoid social activities because I cannot hear well and fear I will reply improperly: No   Family members and friends have told me they think I may have hearing loss: No                 Assessment & Plan:   Philomena Kwon is a 69 year old female who presents for a Medicare Assessment.   (Z00.00) Medicare annual wellness visit, subsequent  (primary encounter diagnosis)  Plan:  Patient is independent with ADL.s    Health screening   Colonoscopy, mammography, dexa scan  And routine eye exam advised.    (R73.03) Prediabetes  Plan: Hemoglobin A1C, Urinalysis, Routine        HGBA1C:    Lab Results   Component Value Date    A1C 6.1 (H) 11/21/2023     A1C 6.3 (H) 01/18/2023    A1C 6.1 (H) 12/23/2021     (H) 11/21/2023     Controlled monitor  Body mass index is 30.29 kg/m².  Weight loss advised   limit overall caloric intake  Low diet  limit carbohydrate intake   increase activity  as tolerated  exercise    (E55.9) Vitamin D deficiency  Plan: Vitamin D        Supplement monitor    (E78.5) Hyperlipidemia, unspecified hyperlipidemia type  Plan: CBC With Differential With Platelet, Comp         Metabolic Panel (14), Lipid Panel, TSH and Free        T4        Low cholesterol diet advised  Avoid saturated and trans fats       (Z85.3) History of breast cancer  Plan: dong well no known recurrence     (Z90.13) Status post bilateral mastectomy  Plan: chronic  doing well    Patient voiced understanding  and agrees with plan          Reinforced healthy diet, lifestyle, and exercise.      No follow-ups on file.     Barbara Pike MD, 1/7/2025     Supplementary Documentation:   General Health:  In the past six months, have you lost more than 10 pounds without trying?: (Patient-Rptd) 2 - No  Has your appetite been poor?: (Patient-Rptd) No  Type of Diet: (Patient-Rptd) Balanced  How does the patient maintain a good energy level?: (Patient-Rptd) Appropriate Exercise;Stretching  How would you describe your daily physical activity?: (Patient-Rptd) Moderate  How would you describe your current health state?: (Patient-Rptd) Good  How do you maintain positive mental well-being?: (Patient-Rptd) Social Interaction;Visiting Family  On a scale of 0 to 10, with 0 being no pain and 10 being severe pain, what is your pain level?: (Patient-Rptd) 0 - (None)  In the past six months, have you experienced urine leakage?: (Patient-Rptd) 0-No  At any time do you feel concerned for the safety/well-being of yourself and/or your children, in your home or elsewhere?: (Patient-Rptd) No  Have you had any immunizations at another office such as Influenza, Hepatitis B, Tetanus, or Pneumococcal?:  (Patient-Rptd) Yes    Health Maintenance   Topic Date Due    COVID-19 Vaccine (6 - 2024-25 season) 09/01/2024    Annual Depression Screening  01/01/2025    Annual Physical  01/19/2025    Gyne Pelvic Exam  10/17/2025    Colorectal Cancer Screening  05/08/2034    Influenza Vaccine  Completed    DEXA Scan  Completed    Fall Risk Screening (Annual)  Completed    Pneumococcal Vaccine: 65+ Years  Completed    Zoster Vaccines  Completed

## 2025-04-10 NOTE — TELEPHONE ENCOUNTER
Please review; protocol failed/No Protocol      Sent [employer] message to patient to complete labs from 01/07/2025

## 2025-04-12 RX ORDER — ROSUVASTATIN CALCIUM 10 MG/1
10 TABLET, COATED ORAL NIGHTLY
Qty: 90 TABLET | Refills: 0 | Status: SHIPPED | OUTPATIENT
Start: 2025-04-12

## 2025-06-06 ENCOUNTER — LAB ENCOUNTER (OUTPATIENT)
Dept: LAB | Age: 70
End: 2025-06-06
Attending: INTERNAL MEDICINE
Payer: MEDICARE

## 2025-06-06 DIAGNOSIS — E55.9 VITAMIN D DEFICIENCY: ICD-10-CM

## 2025-06-06 LAB
ALBUMIN SERPL-MCNC: 4.7 G/DL (ref 3.2–4.8)
ALBUMIN/GLOB SERPL: 2 {RATIO} (ref 1–2)
ALP LIVER SERPL-CCNC: 73 U/L (ref 55–142)
ALT SERPL-CCNC: 18 U/L (ref 10–49)
ANION GAP SERPL CALC-SCNC: 14 MMOL/L (ref 0–18)
AST SERPL-CCNC: 23 U/L (ref ?–34)
BASOPHILS # BLD AUTO: 0.02 X10(3) UL (ref 0–0.2)
BASOPHILS NFR BLD AUTO: 0.3 %
BILIRUB SERPL-MCNC: 1 MG/DL (ref 0.2–1.1)
BILIRUB UR QL: NEGATIVE
BUN BLD-MCNC: 12 MG/DL (ref 9–23)
BUN/CREAT SERPL: 16.2 (ref 10–20)
CALCIUM BLD-MCNC: 9.8 MG/DL (ref 8.7–10.4)
CHLORIDE SERPL-SCNC: 103 MMOL/L (ref 98–112)
CHOLEST SERPL-MCNC: 155 MG/DL (ref ?–200)
CLARITY UR: CLEAR
CO2 SERPL-SCNC: 24 MMOL/L (ref 21–32)
CREAT BLD-MCNC: 0.74 MG/DL (ref 0.55–1.02)
DEPRECATED RDW RBC AUTO: 47.5 FL (ref 35.1–46.3)
EGFRCR SERPLBLD CKD-EPI 2021: 89 ML/MIN/1.73M2 (ref 60–?)
EOSINOPHIL # BLD AUTO: 0.14 X10(3) UL (ref 0–0.7)
EOSINOPHIL NFR BLD AUTO: 2.4 %
ERYTHROCYTE [DISTWIDTH] IN BLOOD BY AUTOMATED COUNT: 13.2 % (ref 11–15)
EST. AVERAGE GLUCOSE BLD GHB EST-MCNC: 143 MG/DL (ref 68–126)
FASTING PATIENT LIPID ANSWER: YES
FASTING STATUS PATIENT QL REPORTED: YES
GLOBULIN PLAS-MCNC: 2.4 G/DL (ref 2–3.5)
GLUCOSE BLD-MCNC: 113 MG/DL (ref 70–99)
GLUCOSE UR-MCNC: NORMAL MG/DL
HBA1C MFR BLD: 6.6 % (ref ?–5.7)
HCT VFR BLD AUTO: 38.5 % (ref 35–48)
HDLC SERPL-MCNC: 47 MG/DL (ref 40–59)
HGB BLD-MCNC: 12.9 G/DL (ref 12–16)
HGB UR QL STRIP.AUTO: NEGATIVE
IMM GRANULOCYTES # BLD AUTO: 0.02 X10(3) UL (ref 0–1)
IMM GRANULOCYTES NFR BLD: 0.3 %
KETONES UR-MCNC: NEGATIVE MG/DL
LDLC SERPL CALC-MCNC: 80 MG/DL (ref ?–100)
LEUKOCYTE ESTERASE UR QL STRIP.AUTO: NEGATIVE
LYMPHOCYTES # BLD AUTO: 1.35 X10(3) UL (ref 1–4)
LYMPHOCYTES NFR BLD AUTO: 23 %
MCH RBC QN AUTO: 32.7 PG (ref 26–34)
MCHC RBC AUTO-ENTMCNC: 33.5 G/DL (ref 31–37)
MCV RBC AUTO: 97.7 FL (ref 80–100)
MONOCYTES # BLD AUTO: 0.31 X10(3) UL (ref 0.1–1)
MONOCYTES NFR BLD AUTO: 5.3 %
NEUTROPHILS # BLD AUTO: 4.02 X10 (3) UL (ref 1.5–7.7)
NEUTROPHILS # BLD AUTO: 4.02 X10(3) UL (ref 1.5–7.7)
NEUTROPHILS NFR BLD AUTO: 68.7 %
NITRITE UR QL STRIP.AUTO: NEGATIVE
NONHDLC SERPL-MCNC: 108 MG/DL (ref ?–130)
OSMOLALITY SERPL CALC.SUM OF ELEC: 293 MOSM/KG (ref 275–295)
PH UR: 7 [PH] (ref 5–8)
PLATELET # BLD AUTO: 208 10(3)UL (ref 150–450)
PLATELETS.RETICULATED NFR BLD AUTO: 8.1 % (ref 0–7)
POTASSIUM SERPL-SCNC: 4.4 MMOL/L (ref 3.5–5.1)
PROT SERPL-MCNC: 7.1 G/DL (ref 5.7–8.2)
PROT UR-MCNC: NEGATIVE MG/DL
RBC # BLD AUTO: 3.94 X10(6)UL (ref 3.8–5.3)
SODIUM SERPL-SCNC: 141 MMOL/L (ref 136–145)
SP GR UR STRIP: 1.01 (ref 1–1.03)
T4 FREE SERPL-MCNC: 1.1 NG/DL (ref 0.8–1.7)
TRIGL SERPL-MCNC: 160 MG/DL (ref 30–149)
TSI SER-ACNC: 3.08 UIU/ML (ref 0.55–4.78)
UROBILINOGEN UR STRIP-ACNC: NORMAL
VIT D+METAB SERPL-MCNC: 66.6 NG/ML (ref 30–100)
VLDLC SERPL CALC-MCNC: 25 MG/DL (ref 0–30)
WBC # BLD AUTO: 5.9 X10(3) UL (ref 4–11)

## 2025-06-06 PROCEDURE — 82306 VITAMIN D 25 HYDROXY: CPT

## 2025-06-06 PROCEDURE — 84439 ASSAY OF FREE THYROXINE: CPT | Performed by: INTERNAL MEDICINE

## 2025-06-06 PROCEDURE — 81003 URINALYSIS AUTO W/O SCOPE: CPT | Performed by: INTERNAL MEDICINE

## 2025-06-06 PROCEDURE — 36415 COLL VENOUS BLD VENIPUNCTURE: CPT

## 2025-06-06 PROCEDURE — 84443 ASSAY THYROID STIM HORMONE: CPT | Performed by: INTERNAL MEDICINE

## 2025-06-06 PROCEDURE — 80061 LIPID PANEL: CPT | Performed by: INTERNAL MEDICINE

## 2025-06-06 PROCEDURE — 85025 COMPLETE CBC W/AUTO DIFF WBC: CPT | Performed by: INTERNAL MEDICINE

## 2025-06-06 PROCEDURE — 83036 HEMOGLOBIN GLYCOSYLATED A1C: CPT | Performed by: INTERNAL MEDICINE

## 2025-06-06 PROCEDURE — 80053 COMPREHEN METABOLIC PANEL: CPT | Performed by: INTERNAL MEDICINE

## 2025-06-14 DIAGNOSIS — R73.03 PREDIABETES: ICD-10-CM

## 2025-06-16 RX ORDER — METFORMIN HYDROCHLORIDE 500 MG/1
500 TABLET, EXTENDED RELEASE ORAL DAILY
Qty: 90 TABLET | Refills: 3 | Status: SHIPPED | OUTPATIENT
Start: 2025-06-16

## 2025-06-16 NOTE — TELEPHONE ENCOUNTER
Please review.  Protocol failed / Has no protocol.    No Active/ Future labs for patient to complete      Requested Prescriptions   Pending Prescriptions Disp Refills    METFORMIN  MG Oral Tablet 24 Hr [Pharmacy Med Name: METFORMIN HCL  MG TABLET] 90 tablet 3     Sig: Take 1 tablet (500 mg total) by mouth daily.       Diabetes Medication Protocol Failed - 6/16/2025  5:38 PM        Failed - Microalbumin procedure in past 12 months or taking ACE/ARB        Passed - Last A1C < 7.5 and within past 6 months        Passed - In person appointment or virtual visit in the past 6 mos or appointment in next 3 mos        Passed - EGFRCR or GFRNAA > 50        Passed - GFR in the past 12 months        Passed - Medication is active on med list

## 2025-07-16 RX ORDER — ROSUVASTATIN CALCIUM 10 MG/1
10 TABLET, COATED ORAL NIGHTLY
Qty: 90 TABLET | Refills: 3 | Status: SHIPPED | OUTPATIENT
Start: 2025-07-16

## (undated) DEVICE — KIT ENDO ORCAPOD 160/180/190

## (undated) DEVICE — KIT CLEAN ENDOKIT 1.1OZ GOWNX2

## (undated) DEVICE — 60 ML SYRINGE REGULAR TIP: Brand: MONOJECT

## (undated) DEVICE — Device: Brand: DUAL NARE NASAL CANNULAE FEMALE LUER CON 7FT O2 TUBE

## (undated) DEVICE — MEDI-VAC NON-CONDUCTIVE SUCTION TUBING 6MM X 1.8M (6FT.) L: Brand: CARDINAL HEALTH

## (undated) NOTE — LETTER
November 22, 2019     Tiarra Steele 61      Dear Chino Bernardo:    Below are the results from your recent visit:    negaitive pap   Negative HPV     Resulted Orders   HPV HIGH RISK , THIN PREP COLLECTION   Result Value Re First Screen:          Maci Troncoso                                                              Specimen:     ThinPrep Imager Screening Pap, Cervical/endocervical                                            If you have any questions or concerns, please d

## (undated) NOTE — LETTER
June 4, 2021     Brianna Joe 80      Dear Leslie Denali:    Below are the results from your recent visit:Vitamin D is normal.  Continue supplement.       Resulted Orders   VITAMIN D, 25-HYDROXY   Result Value Ref Range

## (undated) NOTE — LETTER
November 20, 2019     Clark Steele 61      Dear Jamel Me:    Below are the results from your recent visit:    HPV NEGATIVE    Resulted Orders   HPV HIGH RISK , THIN PREP COLLECTION   Result Value Ref Range    HPV Hi

## (undated) NOTE — LETTER
December 24, 2020     Brianna Joe 89      Dear Jolene Chavarria:    Below are the results from your recent visit:Low vit D     presccription sent to pharmacy for    Take once a week for 3 months   Thereafter take Bilirubin, Total 1.0 0.1 - 2.0 mg/dL    Total Protein 7.5 6.4 - 8.2 g/dL    Albumin 3.7 3.4 - 5.0 g/dL    Globulin  3.8 2.8 - 4.4 g/dL    A/G Ratio 1.0 1.0 - 2.0    FASTING Yes    HEMOGLOBIN A1C   Result Value Ref Range    HgbA1C 6.0 (H) <5.7 %    Estimat

## (undated) NOTE — LETTER
[place] [address], [place], [place]  Authorization for Surgical Operation and Procedure                                                                                           I hereby authorize Romario Sweeney MD, my physician and his/her assistants (if applicable), which may include medical students, residents, and/or fellows, to perform the following surgical operation/ procedure and administer such anesthesia as may be determined necessary by my physician: Operation/Procedure name (s) COLONOSCOPY on Philomena Kwon   2.   I recognize that during the surgical operation/procedure, unforeseen conditions may necessitate additional or different procedures than those listed above.  I, therefore, further authorize and request that the above-named surgeon, assistants, or designees perform such procedures as are, in their judgment, necessary and desirable.    3.   My surgeon/physician has discussed prior to my surgery the potential benefits, risks and side effects of this procedure; the likelihood of achieving goals; and potential problems that might occur during recuperation.  They also discussed reasonable alternatives to the procedure, including risks, benefits, and side effects related to the alternatives and risks related to not receiving this procedure.  I have had all my questions answered and I acknowledge that no guarantee has been made as to the result that may be obtained.    4.   Should the need arise during my operation/procedure, which includes change of level of care prior to discharge, I also consent to the administration of blood and/or blood products.  Further, I understand that despite careful testing and screening of blood or blood products by collecting agencies, I may still be subject to ill effects as a result of receiving a blood transfusion and/or blood products.  The following are some, but not all, of the potential risks that can occur: fever and allergic  reactions, hemolytic reactions, transmission of diseases such as Hepatitis, AIDS and Cytomegalovirus (CMV) and fluid overload.  In the event that I wish to have an autologous transfusion of my own blood, or a directed donor transfusion, I will discuss this with my physician.  Check only if Refusing Blood or Blood Products  I understand refusal of blood or blood products as deemed necessary by my physician may have serious consequences to my condition to include possible death. I hereby assume responsibility for my refusal and release the hospital, its personnel, and my physicians from any responsibility for the consequences of my refusal.    o  Refuse   5.   I authorize the use of any specimen, organs, tissues, body parts or foreign objects that may be removed from my body during the operation/procedure for diagnosis, research or teaching purposes and their subsequent disposal by hospital authorities.  I also authorize the release of specimen test results and/or written reports to my treating physician on the hospital medical staff or other referring or consulting physicians involved in my care, at the discretion of the Pathologist or my treating physician.    6.   I consent to the photographing or videotaping of the operations or procedures to be performed, including appropriate portions of my body for medical, scientific, or educational purposes, provided my identity is not revealed by the pictures or by descriptive texts accompanying them.  If the procedure has been photographed/videotaped, the surgeon will obtain the original picture, image, videotape or CD.  The hospital will not be responsible for storage, release or maintenance of the picture, image, tape or CD.    7.   I consent to the presence of a  or observers in the operating room as deemed necessary by my physician or their designees.    8.   I recognize that in the event my procedure results in extended X-Ray/fluoroscopy time, I may  develop a skin reaction.    9. If I have a Do Not Attempt Resuscitation (DNAR) order in place, that status will be suspended while in the operating room, procedural suite, and during the recovery period unless otherwise explicitly stated by me (or a person authorized to consent on my behalf). The surgeon or my attending physician will determine when the applicable recovery period ends for purposes of reinstating the DNAR order.  10. Patients having a sterilization procedure: I understand that if the procedure is successful the results will be permanent and it will therefore be impossible for me to inseminate, conceive, or bear children.  I also understand that the procedure is intended to result in sterility, although the result has not been guaranteed.   11. I acknowledge that my physician has explained sedation/analgesia administration to me including the risk and benefits I consent to the administration of sedation/analgesia as may be necessary or desirable in the judgment of my physician.    I CERTIFY THAT I HAVE READ AND FULLY UNDERSTAND THE ABOVE CONSENT TO OPERATION and/or OTHER PROCEDURE.     _________________________________________ _________________________________     ___________________________________  Signature of Patient     Signature of Responsible Person                   Printed Name of Responsible Person                              _________________________________________ ______________________________        ___________________________________  Signature of Witness         Date  Time         Relationship to Patient    STATEMENT OF PHYSICIAN My signature below affirms that prior to the time of the procedure; I have explained to the patient and/or his/her legal representative, the risks and benefits involved in the proposed treatment and any reasonable alternative to the proposed treatment. I have also explained the risks and benefits involved in refusal of the proposed treatment and alternatives  to the proposed treatment and have answered the patient's questions. If I have a significant financial interest in a co-management agreement or a significant financial interest in any product or implant, or other significant relationship used in this procedure/surgery, I have disclosed this and had a discussion with my patient.     _______________________________________________________________ _____________________________  (Signature of Physician)                                                                                         (Date)                                   (Time)  Patient Name: Philomena Kwon    : 1955   Printed: 2024      Medical Record #: [id number]                                              Page 1 of 1

## (undated) NOTE — LETTER
[facility]  Administration of Anesthesia  IPhilomena agree to be cared for by a physician anesthesiologist alone and/or with a nurse anesthetist, who is specially trained to monitor me and give me medicine to put me to sleep or keep me comfortable during my procedure    I understand that my anesthesiologist and/or anesthetist is not an employee or agent of [facility] or [employer]. He or she works for [facility]    As the patient asking for anesthesia services, I agree to:  Allow the anesthesiologist (anesthesia doctor) to give me medicine and do additional procedures as necessary. Some examples are: Starting or using an “IV” to give me medicine, fluids or blood during my procedure, and having a breathing tube placed to help me breathe when I’m asleep (intubation). In the event that my heart stops working properly, I understand that my anesthesiologist will make every effort to sustain my life, unless otherwise directed by [facility] Do Not Resuscitate documents.  Tell my anesthesia doctor before my procedure:  If I am pregnant.  The last time that I ate or drank.  iii. All of the medicines I take (including prescriptions, herbal supplements, and pills I can buy without a prescription (including street drugs/illegal medications). Failure to inform my anesthesiologist about these medicines may increase my risk of anesthetic complications.  iv.If I am allergic to anything or have had a reaction to anesthesia before.  I understand how the anesthesia medicine will help me (benefits).  I understand that with any type of anesthesia medicine there are risks:  The most common risks are: nausea, vomiting, sore throat, muscle soreness, damage to my eyes, mouth, or teeth (from breathing tube placement).  Rare risks include: remembering what happened during my procedure, allergic reactions to medications, injury to my airway, heart, lungs, vision, nerves, or  muscles and in extremely rare instances death.  My doctor has explained to me other choices available to me for my care (alternatives).  Pregnant Patients (“epidural”):  I understand that the risks of having an epidural (medicine given into my back to help control pain during labor), include itching, low blood pressure, difficulty urinating, headache or slowing of the baby’s heart. Very rare risks include infection, bleeding, seizure, irregular heart rhythms and nerve injury.  Regional Anesthesia (“spinal”, “epidural”, & “nerve blocks”):  I understand that rare but potential complications include headache, bleeding, infection, seizure, irregular heart rhythms, and nerve injury.    _____________________________________________________________________________  Patient (or Representative) Signature/Relationship to Patient  Date   Time    _____________________________________________________________________________   Name (if used)    Language/Organization   Time    _____________________________________________________________________________  Nurse Anesthetist Signature     Date   Time  _____________________________________________________________________________  Anesthesiologist Signature     Date   Time  I have discussed the procedure and information above with the patient (or patient’s representative) and answered their questions. The patient or their representative has agreed to have anesthesia services.    _____________________________________________________________________________  Witness        Date   Time  I have verified that the signature is that of the patient or patient’s representative, and that it was signed before the procedure  Patient Name: Philomena Kwon     : 1955                 Printed: 2024 at 7:31 AM    Medical Record #: [id number]                                            Page 1 of 1  ----------ANESTHESIA CONSENT----------

## (undated) NOTE — LETTER
November 12, 2018     Ary Steele 61      Dear Hiwot Hrutado:    Below are the results from your recent visit:  ASCVD  Risk  Or heart disease risk estimated over 10years is 4 percent  This is low risk   Not indicated to ALT 17 6 - 29 U/L    Narrative    FASTING:YES  FASTING: YES   FREE T4 (FREE THYROXINE)   Result Value Ref Range    T4, FREE 1.0 0.8 - 1.8 ng/dL    Narrative    FASTING:YES  FASTING: YES   HEMOGLOBIN A1C   Result Value Ref Range    HEMOGLOBIN A1c 5.6 <5.7